# Patient Record
Sex: FEMALE | Race: WHITE | URBAN - METROPOLITAN AREA
[De-identification: names, ages, dates, MRNs, and addresses within clinical notes are randomized per-mention and may not be internally consistent; named-entity substitution may affect disease eponyms.]

---

## 2024-04-17 ENCOUNTER — TELEPHONE (OUTPATIENT)
Dept: PRIMARY CARE CLINIC | Age: 44
End: 2024-04-17

## 2024-04-17 NOTE — TELEPHONE ENCOUNTER
----- Message from Marilynn Hurley sent at 4/16/2024  3:55 PM EDT -----  Subject: Appointment Request    Reason for Call: New Patient/New to Provider Appointment needed: New   Patient Request Appointment    QUESTIONS    Reason for appointment request? Available appointments did not meet   patient need     Additional Information for Provider? Pt wants appt after 315pm, so will   always need an appt after 315pm. Please call pt and let her know if this   is possible.   ---------------------------------------------------------------------------  --------------  CALL BACK INFO  [phone]; OK to leave message on voicemail  ---------------------------------------------------------------------------  --------------  SCRIPT ANSWERS

## 2024-04-19 NOTE — TELEPHONE ENCOUNTER
Patient called stating she needs a PCP before May as her provider with [facility] is leaving.    Writer informed patient that the only provider that has a new patient appointment after 3:15 is pushed out until June with evening new patient appointments.    Patient then stated \"Well my provider is leaving and that's not my fault that I can't get my medication for May.\"    Writer offered caller a 7:45 am in May.    Writer also informed patient that there are providers with 2:00pm-2:45pm  open spots and pt stated that will not work as she can only do after 3:15.     Caller asked to be informed of any cancellations.

## 2024-05-10 ENCOUNTER — HOSPITAL ENCOUNTER (EMERGENCY)
Age: 44
Discharge: ANOTHER HOSPITAL | End: 2024-05-10
Attending: EMERGENCY MEDICINE
Payer: MEDICAID

## 2024-05-10 ENCOUNTER — APPOINTMENT (OUTPATIENT)
Dept: GENERAL RADIOLOGY | Age: 44
End: 2024-05-10
Payer: MEDICAID

## 2024-05-10 ENCOUNTER — APPOINTMENT (OUTPATIENT)
Dept: CT IMAGING | Age: 44
End: 2024-05-10
Payer: MEDICAID

## 2024-05-10 ENCOUNTER — HOSPITAL ENCOUNTER (INPATIENT)
Age: 44
LOS: 3 days | Discharge: HOME | End: 2024-05-13
Attending: PSYCHIATRY & NEUROLOGY | Admitting: PSYCHIATRY & NEUROLOGY
Payer: MEDICAID

## 2024-05-10 VITALS
DIASTOLIC BLOOD PRESSURE: 78 MMHG | HEART RATE: 86 BPM | TEMPERATURE: 98.7 F | RESPIRATION RATE: 12 BRPM | BODY MASS INDEX: 31.18 KG/M2 | SYSTOLIC BLOOD PRESSURE: 128 MMHG | OXYGEN SATURATION: 98 % | WEIGHT: 194 LBS | HEIGHT: 66 IN

## 2024-05-10 DIAGNOSIS — R51.9 NONINTRACTABLE HEADACHE, UNSPECIFIED CHRONICITY PATTERN, UNSPECIFIED HEADACHE TYPE: ICD-10-CM

## 2024-05-10 DIAGNOSIS — R51.9 ACUTE NONINTRACTABLE HEADACHE, UNSPECIFIED HEADACHE TYPE: Primary | ICD-10-CM

## 2024-05-10 DIAGNOSIS — G03.9 MENINGITIS: Primary | ICD-10-CM

## 2024-05-10 LAB
ALBUMIN SERPL-MCNC: 4.6 G/DL (ref 3.5–5.2)
ALBUMIN/GLOB SERPL: 1.8 {RATIO} (ref 1–2.5)
ALP SERPL-CCNC: 59 U/L (ref 35–104)
ALT SERPL-CCNC: 11 U/L (ref 5–33)
ANION GAP SERPL CALCULATED.3IONS-SCNC: 10 MMOL/L (ref 9–17)
AST SERPL-CCNC: 17 U/L
BASOPHILS # BLD: 0.1 K/UL (ref 0–0.2)
BASOPHILS NFR BLD: 1 % (ref 0–2)
BILIRUB SERPL-MCNC: 0.2 MG/DL (ref 0.3–1.2)
BILIRUB UR QL STRIP: NEGATIVE
BUN SERPL-MCNC: 13 MG/DL (ref 6–20)
CALCIUM SERPL-MCNC: 9.2 MG/DL (ref 8.6–10.4)
CHLORIDE SERPL-SCNC: 104 MMOL/L (ref 98–107)
CLARITY UR: CLEAR
CO2 SERPL-SCNC: 25 MMOL/L (ref 20–31)
COLOR UR: YELLOW
COMMENT: NORMAL
CREAT SERPL-MCNC: 0.7 MG/DL (ref 0.5–0.9)
EOSINOPHIL # BLD: 0.1 K/UL (ref 0–0.4)
EOSINOPHILS RELATIVE PERCENT: 2 % (ref 1–4)
ERYTHROCYTE [DISTWIDTH] IN BLOOD BY AUTOMATED COUNT: 13.6 % (ref 12.5–15.4)
FLUAV AG SPEC QL: NEGATIVE
FLUBV AG SPEC QL: NEGATIVE
GFR, ESTIMATED: >90 ML/MIN/1.73M2
GLUCOSE SERPL-MCNC: 110 MG/DL (ref 70–99)
GLUCOSE UR STRIP-MCNC: NEGATIVE MG/DL
HCT VFR BLD AUTO: 40.3 % (ref 36–46)
HGB BLD-MCNC: 13.4 G/DL (ref 12–16)
HGB UR QL STRIP.AUTO: NEGATIVE
INR PPP: 0.9
KETONES UR STRIP-MCNC: NEGATIVE MG/DL
LACTATE BLDV-SCNC: 1.3 MMOL/L (ref 0.5–2.2)
LEUKOCYTE ESTERASE UR QL STRIP: NEGATIVE
LIPASE SERPL-CCNC: 32 U/L (ref 13–60)
LYMPHOCYTES NFR BLD: 2.4 K/UL (ref 1–4.8)
LYMPHOCYTES RELATIVE PERCENT: 33 % (ref 24–44)
MAGNESIUM SERPL-MCNC: 2.2 MG/DL (ref 1.6–2.6)
MCH RBC QN AUTO: 29.1 PG (ref 26–34)
MCHC RBC AUTO-ENTMCNC: 33.2 G/DL (ref 31–37)
MCV RBC AUTO: 87.7 FL (ref 80–100)
MONOCYTES NFR BLD: 0.5 K/UL (ref 0.1–1.2)
MONOCYTES NFR BLD: 7 % (ref 2–11)
NEUTROPHILS NFR BLD: 57 % (ref 36–66)
NEUTS SEG NFR BLD: 4.2 K/UL (ref 1.8–7.7)
NITRITE UR QL STRIP: NEGATIVE
PARTIAL THROMBOPLASTIN TIME: 25 SEC (ref 21.3–31.3)
PH UR STRIP: 8 [PH] (ref 5–8)
PLATELET # BLD AUTO: 280 K/UL (ref 140–450)
PMV BLD AUTO: 7.2 FL (ref 6–12)
POTASSIUM SERPL-SCNC: 3.9 MMOL/L (ref 3.7–5.3)
PROT SERPL-MCNC: 7.2 G/DL (ref 6.4–8.3)
PROT UR STRIP-MCNC: NEGATIVE MG/DL
PROTHROMBIN TIME: 9.3 SEC (ref 9.4–12.6)
RBC # BLD AUTO: 4.6 M/UL (ref 4–5.2)
SARS-COV-2 RDRP RESP QL NAA+PROBE: NOT DETECTED
SODIUM SERPL-SCNC: 139 MMOL/L (ref 135–144)
SP GR UR STRIP: 1.01 (ref 1–1.03)
SPECIMEN DESCRIPTION: NORMAL
UROBILINOGEN UR STRIP-ACNC: NORMAL EU/DL (ref 0–1)
WBC OTHER # BLD: 7.3 K/UL (ref 3.5–11)

## 2024-05-10 PROCEDURE — 87040 BLOOD CULTURE FOR BACTERIA: CPT

## 2024-05-10 PROCEDURE — 2060000000 HC ICU INTERMEDIATE R&B

## 2024-05-10 PROCEDURE — 96375 TX/PRO/DX INJ NEW DRUG ADDON: CPT

## 2024-05-10 PROCEDURE — 87804 INFLUENZA ASSAY W/OPTIC: CPT

## 2024-05-10 PROCEDURE — 36415 COLL VENOUS BLD VENIPUNCTURE: CPT

## 2024-05-10 PROCEDURE — 83605 ASSAY OF LACTIC ACID: CPT

## 2024-05-10 PROCEDURE — 85730 THROMBOPLASTIN TIME PARTIAL: CPT

## 2024-05-10 PROCEDURE — 6370000000 HC RX 637 (ALT 250 FOR IP)

## 2024-05-10 PROCEDURE — 96366 THER/PROPH/DIAG IV INF ADDON: CPT

## 2024-05-10 PROCEDURE — 85610 PROTHROMBIN TIME: CPT

## 2024-05-10 PROCEDURE — 62270 DX LMBR SPI PNXR: CPT

## 2024-05-10 PROCEDURE — 96376 TX/PRO/DX INJ SAME DRUG ADON: CPT

## 2024-05-10 PROCEDURE — 83690 ASSAY OF LIPASE: CPT

## 2024-05-10 PROCEDURE — 85025 COMPLETE CBC W/AUTO DIFF WBC: CPT

## 2024-05-10 PROCEDURE — 70450 CT HEAD/BRAIN W/O DYE: CPT

## 2024-05-10 PROCEDURE — 81003 URINALYSIS AUTO W/O SCOPE: CPT

## 2024-05-10 PROCEDURE — 2580000003 HC RX 258

## 2024-05-10 PROCEDURE — 80053 COMPREHEN METABOLIC PANEL: CPT

## 2024-05-10 PROCEDURE — 96367 TX/PROPH/DG ADDL SEQ IV INF: CPT

## 2024-05-10 PROCEDURE — 83735 ASSAY OF MAGNESIUM: CPT

## 2024-05-10 PROCEDURE — 6360000002 HC RX W HCPCS

## 2024-05-10 PROCEDURE — 71046 X-RAY EXAM CHEST 2 VIEWS: CPT

## 2024-05-10 PROCEDURE — 6360000002 HC RX W HCPCS: Performed by: EMERGENCY MEDICINE

## 2024-05-10 PROCEDURE — 96365 THER/PROPH/DIAG IV INF INIT: CPT

## 2024-05-10 PROCEDURE — 87635 SARS-COV-2 COVID-19 AMP PRB: CPT

## 2024-05-10 RX ORDER — SODIUM CHLORIDE 9 MG/ML
INJECTION, SOLUTION INTRAVENOUS PRN
Status: DISCONTINUED | OUTPATIENT
Start: 2024-05-10 | End: 2024-05-13 | Stop reason: HOSPADM

## 2024-05-10 RX ORDER — VENLAFAXINE 100 MG/1
100 TABLET ORAL 2 TIMES DAILY
COMMUNITY

## 2024-05-10 RX ORDER — ACETAMINOPHEN 325 MG/1
650 TABLET ORAL EVERY 6 HOURS PRN
Status: DISCONTINUED | OUTPATIENT
Start: 2024-05-10 | End: 2024-05-13 | Stop reason: HOSPADM

## 2024-05-10 RX ORDER — POLYETHYLENE GLYCOL 3350 17 G/17G
17 POWDER, FOR SOLUTION ORAL DAILY PRN
Status: DISCONTINUED | OUTPATIENT
Start: 2024-05-10 | End: 2024-05-13 | Stop reason: HOSPADM

## 2024-05-10 RX ORDER — POTASSIUM CHLORIDE 7.45 MG/ML
10 INJECTION INTRAVENOUS PRN
Status: DISCONTINUED | OUTPATIENT
Start: 2024-05-10 | End: 2024-05-13 | Stop reason: HOSPADM

## 2024-05-10 RX ORDER — MAGNESIUM SULFATE IN WATER 40 MG/ML
2000 INJECTION, SOLUTION INTRAVENOUS ONCE
Status: COMPLETED | OUTPATIENT
Start: 2024-05-11 | End: 2024-05-11

## 2024-05-10 RX ORDER — SODIUM CHLORIDE 0.9 % (FLUSH) 0.9 %
5-40 SYRINGE (ML) INJECTION EVERY 12 HOURS SCHEDULED
Status: DISCONTINUED | OUTPATIENT
Start: 2024-05-10 | End: 2024-05-13 | Stop reason: HOSPADM

## 2024-05-10 RX ORDER — DEXAMETHASONE SODIUM PHOSPHATE 10 MG/ML
10 INJECTION, SOLUTION INTRAMUSCULAR; INTRAVENOUS ONCE
Status: COMPLETED | OUTPATIENT
Start: 2024-05-10 | End: 2024-05-10

## 2024-05-10 RX ORDER — ACETAMINOPHEN 650 MG/1
650 SUPPOSITORY RECTAL EVERY 6 HOURS PRN
Status: DISCONTINUED | OUTPATIENT
Start: 2024-05-10 | End: 2024-05-13 | Stop reason: HOSPADM

## 2024-05-10 RX ORDER — SODIUM CHLORIDE 0.9 % (FLUSH) 0.9 %
5-40 SYRINGE (ML) INJECTION PRN
Status: DISCONTINUED | OUTPATIENT
Start: 2024-05-10 | End: 2024-05-13 | Stop reason: HOSPADM

## 2024-05-10 RX ORDER — KETOROLAC TROMETHAMINE 15 MG/ML
15 INJECTION, SOLUTION INTRAMUSCULAR; INTRAVENOUS EVERY 4 HOURS
Status: DISCONTINUED | OUTPATIENT
Start: 2024-05-11 | End: 2024-05-11

## 2024-05-10 RX ORDER — MORPHINE SULFATE 2 MG/ML
2 INJECTION, SOLUTION INTRAMUSCULAR; INTRAVENOUS
Status: COMPLETED | OUTPATIENT
Start: 2024-05-10 | End: 2024-05-10

## 2024-05-10 RX ORDER — MAGNESIUM SULFATE IN WATER 40 MG/ML
2000 INJECTION, SOLUTION INTRAVENOUS PRN
Status: DISCONTINUED | OUTPATIENT
Start: 2024-05-10 | End: 2024-05-13 | Stop reason: HOSPADM

## 2024-05-10 RX ORDER — PROCHLORPERAZINE EDISYLATE 5 MG/ML
10 INJECTION INTRAMUSCULAR; INTRAVENOUS ONCE
Status: COMPLETED | OUTPATIENT
Start: 2024-05-11 | End: 2024-05-11

## 2024-05-10 RX ORDER — MORPHINE SULFATE 4 MG/ML
4 INJECTION, SOLUTION INTRAMUSCULAR; INTRAVENOUS ONCE
Status: COMPLETED | OUTPATIENT
Start: 2024-05-10 | End: 2024-05-10

## 2024-05-10 RX ORDER — KETOROLAC TROMETHAMINE 15 MG/ML
30 INJECTION, SOLUTION INTRAMUSCULAR; INTRAVENOUS ONCE
Status: COMPLETED | OUTPATIENT
Start: 2024-05-11 | End: 2024-05-11

## 2024-05-10 RX ORDER — IBUPROFEN 200 MG
400 TABLET ORAL EVERY 6 HOURS PRN
Status: DISCONTINUED | OUTPATIENT
Start: 2024-05-10 | End: 2024-05-10 | Stop reason: HOSPADM

## 2024-05-10 RX ORDER — DEXAMETHASONE SODIUM PHOSPHATE 4 MG/ML
4 INJECTION, SOLUTION INTRA-ARTICULAR; INTRALESIONAL; INTRAMUSCULAR; INTRAVENOUS; SOFT TISSUE EVERY 8 HOURS
Status: COMPLETED | OUTPATIENT
Start: 2024-05-11 | End: 2024-05-11

## 2024-05-10 RX ORDER — ENOXAPARIN SODIUM 100 MG/ML
40 INJECTION SUBCUTANEOUS DAILY
Status: DISCONTINUED | OUTPATIENT
Start: 2024-05-11 | End: 2024-05-13 | Stop reason: HOSPADM

## 2024-05-10 RX ORDER — KETOROLAC TROMETHAMINE 15 MG/ML
15 INJECTION, SOLUTION INTRAMUSCULAR; INTRAVENOUS EVERY 6 HOURS PRN
Status: DISCONTINUED | OUTPATIENT
Start: 2024-05-10 | End: 2024-05-10

## 2024-05-10 RX ORDER — SODIUM CHLORIDE 9 MG/ML
INJECTION, SOLUTION INTRAVENOUS CONTINUOUS
Status: DISCONTINUED | OUTPATIENT
Start: 2024-05-10 | End: 2024-05-13 | Stop reason: HOSPADM

## 2024-05-10 RX ORDER — PREGABALIN 100 MG/1
100 CAPSULE ORAL 2 TIMES DAILY
Status: ON HOLD | COMMUNITY

## 2024-05-10 RX ORDER — PREGABALIN 100 MG/1
100 CAPSULE ORAL 2 TIMES DAILY
Status: DISCONTINUED | OUTPATIENT
Start: 2024-05-10 | End: 2024-05-13 | Stop reason: HOSPADM

## 2024-05-10 RX ORDER — ONDANSETRON 4 MG/1
4 TABLET, ORALLY DISINTEGRATING ORAL EVERY 8 HOURS PRN
Status: DISCONTINUED | OUTPATIENT
Start: 2024-05-10 | End: 2024-05-13 | Stop reason: HOSPADM

## 2024-05-10 RX ORDER — ONDANSETRON 2 MG/ML
4 INJECTION INTRAMUSCULAR; INTRAVENOUS ONCE
Status: COMPLETED | OUTPATIENT
Start: 2024-05-10 | End: 2024-05-10

## 2024-05-10 RX ORDER — LORAZEPAM 2 MG/ML
4 INJECTION INTRAMUSCULAR EVERY 5 MIN PRN
Status: DISCONTINUED | OUTPATIENT
Start: 2024-05-10 | End: 2024-05-13 | Stop reason: HOSPADM

## 2024-05-10 RX ORDER — ONDANSETRON 2 MG/ML
4 INJECTION INTRAMUSCULAR; INTRAVENOUS EVERY 6 HOURS PRN
Status: DISCONTINUED | OUTPATIENT
Start: 2024-05-10 | End: 2024-05-13 | Stop reason: HOSPADM

## 2024-05-10 RX ORDER — POTASSIUM CHLORIDE 20 MEQ/1
40 TABLET, EXTENDED RELEASE ORAL PRN
Status: DISCONTINUED | OUTPATIENT
Start: 2024-05-10 | End: 2024-05-13 | Stop reason: HOSPADM

## 2024-05-10 RX ORDER — DIPHENHYDRAMINE HYDROCHLORIDE 50 MG/ML
50 INJECTION INTRAMUSCULAR; INTRAVENOUS ONCE
Status: COMPLETED | OUTPATIENT
Start: 2024-05-11 | End: 2024-05-11

## 2024-05-10 RX ADMIN — ONDANSETRON 4 MG: 2 INJECTION INTRAMUSCULAR; INTRAVENOUS at 17:00

## 2024-05-10 RX ADMIN — IBUPROFEN 400 MG: 200 TABLET, FILM COATED ORAL at 16:34

## 2024-05-10 RX ADMIN — CEFTRIAXONE SODIUM 1000 MG: 1 INJECTION, POWDER, FOR SOLUTION INTRAMUSCULAR; INTRAVENOUS at 16:35

## 2024-05-10 RX ADMIN — VANCOMYCIN HYDROCHLORIDE 2000 MG: 1 INJECTION, POWDER, LYOPHILIZED, FOR SOLUTION INTRAVENOUS at 17:00

## 2024-05-10 RX ADMIN — DEXAMETHASONE SODIUM PHOSPHATE 10 MG: 10 INJECTION INTRAMUSCULAR; INTRAVENOUS at 16:37

## 2024-05-10 RX ADMIN — MORPHINE SULFATE 4 MG: 4 INJECTION INTRAVENOUS at 19:22

## 2024-05-10 RX ADMIN — ACYCLOVIR SODIUM 700 MG: 50 INJECTION, SOLUTION INTRAVENOUS at 19:30

## 2024-05-10 RX ADMIN — MORPHINE SULFATE 4 MG: 4 INJECTION INTRAVENOUS at 17:00

## 2024-05-10 RX ADMIN — MORPHINE SULFATE 2 MG: 2 INJECTION, SOLUTION INTRAMUSCULAR; INTRAVENOUS at 23:52

## 2024-05-10 RX ADMIN — DEXAMETHASONE SODIUM PHOSPHATE 4 MG: 4 INJECTION, SOLUTION INTRAMUSCULAR; INTRAVENOUS at 23:55

## 2024-05-10 RX ADMIN — PREGABALIN 100 MG: 100 CAPSULE ORAL at 23:43

## 2024-05-10 ASSESSMENT — ENCOUNTER SYMPTOMS
PHOTOPHOBIA: 1
SHORTNESS OF BREATH: 0
NAUSEA: 1
NAUSEA: 0
VOMITING: 0
SORE THROAT: 0
CHEST TIGHTNESS: 0
DIARRHEA: 0
VOMITING: 1
CONSTIPATION: 0
COUGH: 0
ABDOMINAL PAIN: 0

## 2024-05-10 ASSESSMENT — PAIN SCALES - GENERAL
PAINLEVEL_OUTOF10: 10
PAINLEVEL_OUTOF10: 10

## 2024-05-10 ASSESSMENT — PAIN DESCRIPTION - DESCRIPTORS: DESCRIPTORS: ACHING;POUNDING

## 2024-05-10 ASSESSMENT — PAIN DESCRIPTION - PAIN TYPE: TYPE: ACUTE PAIN

## 2024-05-10 ASSESSMENT — PAIN - FUNCTIONAL ASSESSMENT: PAIN_FUNCTIONAL_ASSESSMENT: 0-10

## 2024-05-10 ASSESSMENT — PAIN DESCRIPTION - LOCATION: LOCATION: HEAD;NECK

## 2024-05-10 NOTE — ED NOTES
1933 Face sheet and transport necessity for faxed to [facility] to set up transport at this time

## 2024-05-10 NOTE — ED PROVIDER NOTES
[facility] Emergency Department  [address]  [place] [address]  Phone: [phone]  Fax: [phone]        Pt Name: Cally Ballesteros  MRN: [id number]  Birthdate 1980  Date of evaluation: 5/10/24      CHIEF COMPLAINT     Chief Complaint   Patient presents with    Headache     Persistent, diffuse headache.  Neck pains. Stiff neck. Pain when rotating head left/right.  (Pt reports having history of meningitis 3 times.)         HISTORY OF PRESENT ILLNESS    Cally Ballesteros is a 43 y.o. female who presents to our Emergency Department.    Patient presents emerged part complaining of headache.  Patient states that she has meningitis multiple times in the past.  Unsure if she was viral versus bacterial but states that she was in isolation each time this happened.  Patient states that it feels like her exact same history.  Over the past 4 days she has progressively worsening of headache.  States that she also has neck stiffness and pain around her shoulders.  States that she does not have any cough.  No nausea no vomiting.  No diarrhea no constipation.  States that she does not any fevers but feels like her heart sometimes beat out of her chest.  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  Patient states that last symptoms happen she was in [place].  Patient states that it was not in this area.  States that this was over 5 years ago.        REVIEW OF SYSTEMS       Review of Systems   Constitutional:  Negative for chills, diaphoresis and fever.   HENT:  Negative for drooling.    Eyes:  Positive for photophobia.   Respiratory:  Negative for cough, chest tightness and shortness of breath.    Cardiovascular:  Negative for chest pain and palpitations.   Gastrointestinal:  Negative for abdominal pain, constipation, diarrhea, nausea and vomiting.   Genitourinary:   abnormality of the visualized skull or soft tissues.     No acute intracranial abnormality.     XR CHEST (2 VW)    Result Date: 5/10/2024  EXAMINATION: TWO XRAY VIEWS OF THE CHEST 5/10/2024 3:43 pm COMPARISON: None. HISTORY: ORDERING SYSTEM PROVIDED HISTORY: Shortness of Breath TECHNOLOGIST PROVIDED HISTORY: Shortness of Breath Reason for Exam: SOB, headache FINDINGS: HEART/MEDIASTINUM: The cardiomediastinal silhouette is within normal limits. PLEURA/LUNGS: There are no focal consolidations or pleural effusions. There is no appreciable pneumothorax. BONES/SOFT TISSUE: No acute abnormality.     No radiographic evidence of acute pulmonary disease.       Interpretation per the Radiologist below, if available at the time of this note:  XR CHEST (2 VW)   Final Result   No radiographic evidence of acute pulmonary disease.         CT Head W/O Contrast   Final Result   No acute intracranial abnormality.             LABS:  The following laboratory tests were ordered, reviewed, and interpreted by myself:   Results for orders placed or performed during the hospital encounter of 05/10/24   Blood Culture 1    Specimen: Blood   Result Value Ref Range    Specimen Description .BLOOD     Special Requests LEFT FOREARM 10CC     Culture NO GROWTH <24 HRS    Culture, Blood 2    Specimen: Blood   Result Value Ref Range    Specimen Description .BLOOD     Special Requests 1ML RIGHT HAND     Culture NO GROWTH <24 HRS    COVID-19, Rapid    Specimen: Nasopharyngeal Swab   Result Value Ref Range    Specimen Description .NASOPHARYNGEAL SWAB     SARS-CoV-2, Rapid Not Detected Not Detected   Rapid influenza A/B antigens    Specimen: Nasopharyngeal   Result Value Ref Range    Flu A Antigen NEGATIVE NEGATIVE    Flu B Antigen NEGATIVE NEGATIVE   CBC with Auto Differential   Result Value Ref Range    WBC 7.3 3.5 - 11.0 k/uL    RBC 4.60 4.0 - 5.2 m/uL    Hemoglobin 13.4 12.0 - 16.0 g/dL    Hematocrit 40.3 36 - 46 %    MCV 87.7 80 - 100 fL    MCH 29.1

## 2024-05-10 NOTE — ED PROVIDER NOTES
[facility] Emergency Department  [address]  [place] [address]  Phone: [phone]  Fax: [phone]    EMERGENCY DEPARTMENT ENCOUNTER          Pt Name: Cally Ballesteros  MRN: [id number]  Birthdate 1980  Date of evaluation: 5/10/2024      CHIEF COMPLAINT       Chief Complaint   Patient presents with    Headache     Persistent, diffuse headache.  Neck pains. Stiff neck. Pain when rotating head left/right.  (Pt reports having history of meningitis 3 times.)       HISTORY OF PRESENT ILLNESS       Cally Ballesteros is a 43 y.o. female with a past medical history of multiple episodes of meningitis who presents headache and neck stiffness for patient was at the started 4 days ago and she started experiencing headaches, photosensitivity, back and neck shoulder pain with exhaustion.  And blurry vision.  She notes that her presentation is similar to in the past when she has had meningitis.  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    REVIEW OF SYSTEMS       Review of Systems   Constitutional:  Positive for appetite change and fatigue. Negative for chills and fever.   HENT:  Negative for congestion and sore throat.    Eyes:  Positive for visual disturbance.   Respiratory:  Negative for cough, chest tightness and shortness of breath.    Cardiovascular:  Negative for chest pain.   Gastrointestinal:  Positive for nausea and vomiting. Negative for abdominal pain and diarrhea.   Genitourinary:  Negative for difficulty urinating and dyspareunia.   Skin:  Negative for rash.   Neurological:  Positive for weakness and headaches.      PAST MEDICAL HISTORY    has no past medical history on file.    SURGICAL HISTORY      has no past surgical history on file.    CURRENT MEDICATIONS       Current Discharge Medication List        CONTINUE these medications which have NOT CHANGED    Details   pregabalin (LYRICA) 100 MG capsule Take 1 capsule by mouth 2  (DM,HTN,CA, etc): See past medical history.    Social Determinants of Health affecting care (unable to care for self, lives alone, unemployed, homeless,etc): None    History source(s) (patient,spouse,parent,family,friend,EMS,etc): Patient    Review of external sources (ECF,Hospital records,EMS report, radiology reports, etc): Reviewed    Tests considered but not ordered: Not applicable    Independent interpretation of tests (eg.  X-ray, CAT scan, Doppler studies, EKG): ECG interpreted by myself if completed.    Discussion of x-ray results with radiology: See below    Consults: See below    Consideration for admission/observation (even if discharged): Considered    Prescription considerations: Not applicable    Sepsis considered: Considered but unlikely    Critical Care note written: See below    DIAGNOSTIC RESULTS     EKG: All EKG's are interpreted by the Emergency Department Physician who either signs or Co-signs this chart in the absence of a cardiologist.        RADIOLOGY:   Interpretation per the Radiologist below, if available at the time of this note:  XR CHEST (2 VW)   Final Result   No radiographic evidence of acute pulmonary disease.         CT Head W/O Contrast   Final Result   No acute intracranial abnormality.             No results found.    LABS:  Results for orders placed or performed during the hospital encounter of 05/10/24   COVID-19, Rapid    Specimen: Nasopharyngeal Swab   Result Value Ref Range    Specimen Description .NASOPHARYNGEAL SWAB     SARS-CoV-2, Rapid Not Detected Not Detected   Rapid influenza A/B antigens    Specimen: Nasopharyngeal   Result Value Ref Range    Flu A Antigen NEGATIVE NEGATIVE    Flu B Antigen NEGATIVE NEGATIVE   CBC with Auto Differential   Result Value Ref Range    WBC 7.3 3.5 - 11.0 k/uL    RBC 4.60 4.0 - 5.2 m/uL    Hemoglobin 13.4 12.0 - 16.0 g/dL    Hematocrit 40.3 36 - 46 %    MCV 87.7 80 - 100 fL    MCH 29.1 26 - 34 pg    MCHC 33.2 31 - 37 g/dL    RDW 13.6 12.5    Eosinophils % 2   Basophils % 1   Neutrophils Absolute 4.20   Lymphocytes Absolute 2.40   Monocytes Absolute 0.50   Eosinophils Absolute 0.10   Basophils Absolute 0.10  Unremarkable [PA]   1532 CMP(!):    Sodium 139   Potassium 3.9   Chloride 104   CARBON DIOXIDE 25   Anion Gap 10   Glucose 110(!)   BUN,BUNPL 13   Creatinine 0.7   Est, Glom Filt Rate >90   Calcium 9.2   Total Protein 7.2   Albumin 4.6   Albumin/Globulin Ratio 1.8   Total Bilirubin 0.2(!)   Alkaline Phosphatase 59   ALT 11   AST 17  Unremarkable   [PA]   1532 Lipase:    Lipase 32  Unremarkable [PA]   1533 Lactic Acid:    Lactic Acid 1.3  Unremarkable [PA]   1533 Magnesium:    Magnesium 2.2  Unremarkable [PA]   1610 XR CHEST (2 VW)  No acute process [PA]   1616 Prothrombin Time(!): 9.3  Unremarkable [PA]   1636 CT Head W/O Contrast  No acute processes [PA]   1652 COVID-19, Rapid:    Specimen Description .NASOPHARYNGEAL SWAB   SARS-CoV-2, Rapid Not Detected  Negative [PA]   1659 Rapid influenza A/B antigens:    Flu A Antigen NEGATIVE   Flu B Antigen NEGATIVE  Negative [PA]   1833 An attempt was made to do a spinal tap but we were unable to draw any fluid.  Called neurology, and spoke with Dr. Ceron regarding the inability to get a spinal tap.  Recommended that she be transferred to [facility] so that she could be managed there as otherwise IR will not be able to be present to tap her until next Thursday at [facility]. [PA]   1836 Urinalysis with Reflex to Culture:    Color, UA Yellow   Turbidity UA Clear   Glucose, Ur NEGATIVE   Bilirubin, Urine NEGATIVE   Ketones, Urine NEGATIVE   Specific [place], UA 1.015   Urine Hgb NEGATIVE   pH, Urine 8.0   Protein, UA NEGATIVE   Urobilinogen Normal   Nitrite, Urine NEGATIVE   Leukocyte Esterase, Urine NEGATIVE   COMMENT, [id number] Microscopic exam not performed based on chemical results unless requested in original order.   COMMENT, [id number]        COMMENT, [id number] Utilizing a urinalysis as the only screening

## 2024-05-11 ENCOUNTER — APPOINTMENT (OUTPATIENT)
Dept: MRI IMAGING | Age: 44
End: 2024-05-11
Attending: PSYCHIATRY & NEUROLOGY
Payer: MEDICAID

## 2024-05-11 PROBLEM — R51.9 ACUTE NONINTRACTABLE HEADACHE: Status: ACTIVE | Noted: 2024-05-11

## 2024-05-11 LAB
ANION GAP SERPL CALCULATED.3IONS-SCNC: 12 MMOL/L (ref 9–16)
BASOPHILS # BLD: <0.03 K/UL (ref 0–0.2)
BASOPHILS NFR BLD: 0 % (ref 0–2)
BUN SERPL-MCNC: 14 MG/DL (ref 6–20)
CALCIUM SERPL-MCNC: 8 MG/DL (ref 8.6–10.4)
CHLORIDE SERPL-SCNC: 107 MMOL/L (ref 98–107)
CO2 SERPL-SCNC: 19 MMOL/L (ref 20–31)
CREAT SERPL-MCNC: 0.8 MG/DL (ref 0.5–0.9)
CRP SERPL HS-MCNC: <3 MG/L (ref 0–5)
EOSINOPHIL # BLD: <0.03 K/UL (ref 0–0.44)
EOSINOPHILS RELATIVE PERCENT: 0 % (ref 1–4)
ERYTHROCYTE [DISTWIDTH] IN BLOOD BY AUTOMATED COUNT: 13.6 % (ref 11.8–14.4)
ERYTHROCYTE [SEDIMENTATION RATE] IN BLOOD BY PHOTOMETRIC METHOD: 1 MM/HR (ref 0–20)
GFR, ESTIMATED: >90 ML/MIN/1.73M2
GLUCOSE SERPL-MCNC: 151 MG/DL (ref 74–99)
HCT VFR BLD AUTO: 42.2 % (ref 36.3–47.1)
HGB BLD-MCNC: 13.4 G/DL (ref 11.9–15.1)
HIV 1+2 AB+HIV1 P24 AG SERPL QL IA: NONREACTIVE
IMM GRANULOCYTES # BLD AUTO: 0.06 K/UL (ref 0–0.3)
IMM GRANULOCYTES NFR BLD: 1 %
LYMPHOCYTES NFR BLD: 0.96 K/UL (ref 1.1–3.7)
LYMPHOCYTES RELATIVE PERCENT: 9 % (ref 24–43)
MAGNESIUM SERPL-MCNC: 2.6 MG/DL (ref 1.6–2.6)
MCH RBC QN AUTO: 28.9 PG (ref 25.2–33.5)
MCHC RBC AUTO-ENTMCNC: 31.8 G/DL (ref 28.4–34.8)
MCV RBC AUTO: 91.1 FL (ref 82.6–102.9)
MONOCYTES NFR BLD: 0.29 K/UL (ref 0.1–1.2)
MONOCYTES NFR BLD: 3 % (ref 3–12)
NEUTROPHILS NFR BLD: 88 % (ref 36–65)
NEUTS SEG NFR BLD: 9.79 K/UL (ref 1.5–8.1)
NRBC BLD-RTO: 0 PER 100 WBC
PLATELET # BLD AUTO: 252 K/UL (ref 138–453)
PMV BLD AUTO: 9.4 FL (ref 8.1–13.5)
POTASSIUM SERPL-SCNC: 3.9 MMOL/L (ref 3.7–5.3)
RBC # BLD AUTO: 4.63 M/UL (ref 3.95–5.11)
SODIUM SERPL-SCNC: 138 MMOL/L (ref 136–145)
T PALLIDUM AB SER QL IA: NONREACTIVE
WBC OTHER # BLD: 11.1 K/UL (ref 3.5–11.3)

## 2024-05-11 PROCEDURE — 2060000000 HC ICU INTERMEDIATE R&B

## 2024-05-11 PROCEDURE — 82784 ASSAY IGA/IGD/IGG/IGM EACH: CPT

## 2024-05-11 PROCEDURE — 6360000004 HC RX CONTRAST MEDICATION

## 2024-05-11 PROCEDURE — 87389 HIV-1 AG W/HIV-1&-2 AB AG IA: CPT

## 2024-05-11 PROCEDURE — 6360000002 HC RX W HCPCS

## 2024-05-11 PROCEDURE — 86789 WEST NILE VIRUS ANTIBODY: CPT

## 2024-05-11 PROCEDURE — 86038 ANTINUCLEAR ANTIBODIES: CPT

## 2024-05-11 PROCEDURE — 70553 MRI BRAIN STEM W/O & W/DYE: CPT

## 2024-05-11 PROCEDURE — 36415 COLL VENOUS BLD VENIPUNCTURE: CPT

## 2024-05-11 PROCEDURE — 2580000003 HC RX 258

## 2024-05-11 PROCEDURE — 6370000000 HC RX 637 (ALT 250 FOR IP)

## 2024-05-11 PROCEDURE — 82042 OTHER SOURCE ALBUMIN QUAN EA: CPT

## 2024-05-11 PROCEDURE — 89050 BODY FLUID CELL COUNT: CPT

## 2024-05-11 PROCEDURE — 82945 GLUCOSE OTHER FLUID: CPT

## 2024-05-11 PROCEDURE — 86225 DNA ANTIBODY NATIVE: CPT

## 2024-05-11 PROCEDURE — 87483 CNS DNA AMP PROBE TYPE 12-25: CPT

## 2024-05-11 PROCEDURE — 83916 OLIGOCLONAL BANDS: CPT

## 2024-05-11 PROCEDURE — 85652 RBC SED RATE AUTOMATED: CPT

## 2024-05-11 PROCEDURE — 6360000002 HC RX W HCPCS: Performed by: STUDENT IN AN ORGANIZED HEALTH CARE EDUCATION/TRAINING PROGRAM

## 2024-05-11 PROCEDURE — 86618 LYME DISEASE ANTIBODY: CPT

## 2024-05-11 PROCEDURE — 2580000003 HC RX 258: Performed by: STUDENT IN AN ORGANIZED HEALTH CARE EDUCATION/TRAINING PROGRAM

## 2024-05-11 PROCEDURE — 87070 CULTURE OTHR SPECIMN AEROBIC: CPT

## 2024-05-11 PROCEDURE — 86780 TREPONEMA PALLIDUM: CPT

## 2024-05-11 PROCEDURE — 84157 ASSAY OF PROTEIN OTHER: CPT

## 2024-05-11 PROCEDURE — 83516 IMMUNOASSAY NONANTIBODY: CPT

## 2024-05-11 PROCEDURE — 99222 1ST HOSP IP/OBS MODERATE 55: CPT | Performed by: PSYCHIATRY & NEUROLOGY

## 2024-05-11 PROCEDURE — 86480 TB TEST CELL IMMUN MEASURE: CPT

## 2024-05-11 PROCEDURE — 80048 BASIC METABOLIC PNL TOTAL CA: CPT

## 2024-05-11 PROCEDURE — 86788 WEST NILE VIRUS AB IGM: CPT

## 2024-05-11 PROCEDURE — 83873 ASSAY OF CSF PROTEIN: CPT

## 2024-05-11 PROCEDURE — A9576 INJ PROHANCE MULTIPACK: HCPCS

## 2024-05-11 PROCEDURE — 86140 C-REACTIVE PROTEIN: CPT

## 2024-05-11 PROCEDURE — 82040 ASSAY OF SERUM ALBUMIN: CPT

## 2024-05-11 PROCEDURE — 83735 ASSAY OF MAGNESIUM: CPT

## 2024-05-11 PROCEDURE — 86592 SYPHILIS TEST NON-TREP QUAL: CPT

## 2024-05-11 PROCEDURE — 82164 ANGIOTENSIN I ENZYME TEST: CPT

## 2024-05-11 PROCEDURE — 85025 COMPLETE CBC W/AUTO DIFF WBC: CPT

## 2024-05-11 PROCEDURE — 2500000003 HC RX 250 WO HCPCS: Performed by: STUDENT IN AN ORGANIZED HEALTH CARE EDUCATION/TRAINING PROGRAM

## 2024-05-11 PROCEDURE — 87205 SMEAR GRAM STAIN: CPT

## 2024-05-11 RX ORDER — MAGNESIUM SULFATE 1 G/100ML
1000 INJECTION INTRAVENOUS ONCE
Status: COMPLETED | OUTPATIENT
Start: 2024-05-11 | End: 2024-05-12

## 2024-05-11 RX ORDER — PROCHLORPERAZINE EDISYLATE 5 MG/ML
10 INJECTION INTRAMUSCULAR; INTRAVENOUS ONCE
Status: COMPLETED | OUTPATIENT
Start: 2024-05-12 | End: 2024-05-11

## 2024-05-11 RX ORDER — KETOROLAC TROMETHAMINE 15 MG/ML
30 INJECTION, SOLUTION INTRAMUSCULAR; INTRAVENOUS ONCE
Status: COMPLETED | OUTPATIENT
Start: 2024-05-12 | End: 2024-05-11

## 2024-05-11 RX ORDER — SODIUM CHLORIDE 0.9 % (FLUSH) 0.9 %
10 SYRINGE (ML) INJECTION PRN
Status: DISCONTINUED | OUTPATIENT
Start: 2024-05-11 | End: 2024-05-13 | Stop reason: HOSPADM

## 2024-05-11 RX ORDER — MORPHINE SULFATE 2 MG/ML
2 INJECTION, SOLUTION INTRAMUSCULAR; INTRAVENOUS
Status: COMPLETED | OUTPATIENT
Start: 2024-05-11 | End: 2024-05-11

## 2024-05-11 RX ORDER — DIPHENHYDRAMINE HYDROCHLORIDE 50 MG/ML
25 INJECTION INTRAMUSCULAR; INTRAVENOUS ONCE
Status: COMPLETED | OUTPATIENT
Start: 2024-05-11 | End: 2024-05-11

## 2024-05-11 RX ORDER — DIPHENHYDRAMINE HYDROCHLORIDE 50 MG/ML
25 INJECTION INTRAMUSCULAR; INTRAVENOUS ONCE
Status: COMPLETED | OUTPATIENT
Start: 2024-05-12 | End: 2024-05-11

## 2024-05-11 RX ORDER — VENLAFAXINE 100 MG/1
100 TABLET ORAL 2 TIMES DAILY
Status: DISCONTINUED | OUTPATIENT
Start: 2024-05-11 | End: 2024-05-13 | Stop reason: HOSPADM

## 2024-05-11 RX ORDER — KETOROLAC TROMETHAMINE 30 MG/ML
30 INJECTION, SOLUTION INTRAMUSCULAR; INTRAVENOUS ONCE
Status: COMPLETED | OUTPATIENT
Start: 2024-05-11 | End: 2024-05-11

## 2024-05-11 RX ORDER — MAGNESIUM SULFATE IN WATER 40 MG/ML
2000 INJECTION, SOLUTION INTRAVENOUS ONCE
Status: COMPLETED | OUTPATIENT
Start: 2024-05-11 | End: 2024-05-11

## 2024-05-11 RX ORDER — MAGNESIUM SULFATE 1 G/100ML
1000 INJECTION INTRAVENOUS EVERY 6 HOURS PRN
Status: DISCONTINUED | OUTPATIENT
Start: 2024-05-11 | End: 2024-05-13 | Stop reason: HOSPADM

## 2024-05-11 RX ORDER — KETOROLAC TROMETHAMINE 15 MG/ML
15 INJECTION, SOLUTION INTRAMUSCULAR; INTRAVENOUS EVERY 4 HOURS PRN
Status: DISCONTINUED | OUTPATIENT
Start: 2024-05-11 | End: 2024-05-13 | Stop reason: HOSPADM

## 2024-05-11 RX ADMIN — PREGABALIN 100 MG: 100 CAPSULE ORAL at 21:23

## 2024-05-11 RX ADMIN — MAGNESIUM SULFATE HEPTAHYDRATE 1000 MG: 1 INJECTION, SOLUTION INTRAVENOUS at 23:40

## 2024-05-11 RX ADMIN — Medication 2000 MG: at 00:06

## 2024-05-11 RX ADMIN — ACYCLOVIR SODIUM 700 MG: 50 INJECTION, SOLUTION INTRAVENOUS at 21:26

## 2024-05-11 RX ADMIN — PROCHLORPERAZINE EDISYLATE 10 MG: 5 INJECTION INTRAMUSCULAR; INTRAVENOUS at 00:05

## 2024-05-11 RX ADMIN — ENOXAPARIN SODIUM 40 MG: 100 INJECTION SUBCUTANEOUS at 08:44

## 2024-05-11 RX ADMIN — DIPHENHYDRAMINE HYDROCHLORIDE 25 MG: 50 INJECTION INTRAMUSCULAR; INTRAVENOUS at 23:43

## 2024-05-11 RX ADMIN — DIPHENHYDRAMINE HYDROCHLORIDE 50 MG: 50 INJECTION INTRAMUSCULAR; INTRAVENOUS at 00:05

## 2024-05-11 RX ADMIN — KETOROLAC TROMETHAMINE 15 MG: 15 INJECTION, SOLUTION INTRAMUSCULAR; INTRAVENOUS at 15:43

## 2024-05-11 RX ADMIN — KETOROLAC TROMETHAMINE 15 MG: 15 INJECTION, SOLUTION INTRAMUSCULAR; INTRAVENOUS at 20:08

## 2024-05-11 RX ADMIN — Medication 2000 MG: at 11:20

## 2024-05-11 RX ADMIN — MORPHINE SULFATE 2 MG: 2 INJECTION, SOLUTION INTRAMUSCULAR; INTRAVENOUS at 18:13

## 2024-05-11 RX ADMIN — MAGNESIUM SULFATE HEPTAHYDRATE 2000 MG: 40 INJECTION, SOLUTION INTRAVENOUS at 11:40

## 2024-05-11 RX ADMIN — VENLAFAXINE HYDROCHLORIDE 100 MG: 100 TABLET ORAL at 21:23

## 2024-05-11 RX ADMIN — KETOROLAC TROMETHAMINE 15 MG: 15 INJECTION, SOLUTION INTRAMUSCULAR; INTRAVENOUS at 04:47

## 2024-05-11 RX ADMIN — MAGNESIUM SULFATE HEPTAHYDRATE 1000 MG: 1 INJECTION, SOLUTION INTRAVENOUS at 20:12

## 2024-05-11 RX ADMIN — Medication 2000 MG: at 23:40

## 2024-05-11 RX ADMIN — DIPHENHYDRAMINE HYDROCHLORIDE 25 MG: 50 INJECTION INTRAMUSCULAR; INTRAVENOUS at 11:30

## 2024-05-11 RX ADMIN — VANCOMYCIN HYDROCHLORIDE 1500 MG: 1.5 INJECTION, POWDER, LYOPHILIZED, FOR SOLUTION INTRAVENOUS at 04:50

## 2024-05-11 RX ADMIN — KETOROLAC TROMETHAMINE 15 MG: 15 INJECTION, SOLUTION INTRAMUSCULAR; INTRAVENOUS at 08:44

## 2024-05-11 RX ADMIN — PREGABALIN 100 MG: 100 CAPSULE ORAL at 08:44

## 2024-05-11 RX ADMIN — SODIUM CHLORIDE: 9 INJECTION, SOLUTION INTRAVENOUS at 00:09

## 2024-05-11 RX ADMIN — VALPROATE SODIUM 500 MG: 100 INJECTION, SOLUTION INTRAVENOUS at 19:53

## 2024-05-11 RX ADMIN — ACYCLOVIR SODIUM 700 MG: 50 INJECTION, SOLUTION INTRAVENOUS at 06:26

## 2024-05-11 RX ADMIN — ACETAMINOPHEN 650 MG: 325 TABLET ORAL at 15:42

## 2024-05-11 RX ADMIN — SODIUM CHLORIDE, PRESERVATIVE FREE 10 ML: 5 INJECTION INTRAVENOUS at 00:11

## 2024-05-11 RX ADMIN — GADOTERIDOL 20 ML: 279.3 INJECTION, SOLUTION INTRAVENOUS at 16:25

## 2024-05-11 RX ADMIN — MAGNESIUM SULFATE HEPTAHYDRATE 2000 MG: 40 INJECTION, SOLUTION INTRAVENOUS at 00:11

## 2024-05-11 RX ADMIN — SODIUM CHLORIDE, PRESERVATIVE FREE 10 ML: 5 INJECTION INTRAVENOUS at 21:26

## 2024-05-11 RX ADMIN — DEXAMETHASONE SODIUM PHOSPHATE 4 MG: 4 INJECTION, SOLUTION INTRAMUSCULAR; INTRAVENOUS at 15:48

## 2024-05-11 RX ADMIN — SODIUM CHLORIDE, PRESERVATIVE FREE 10 ML: 5 INJECTION INTRAVENOUS at 15:44

## 2024-05-11 RX ADMIN — VANCOMYCIN HYDROCHLORIDE 1500 MG: 1.5 INJECTION, POWDER, LYOPHILIZED, FOR SOLUTION INTRAVENOUS at 18:07

## 2024-05-11 RX ADMIN — SODIUM CHLORIDE, PRESERVATIVE FREE 10 ML: 5 INJECTION INTRAVENOUS at 18:13

## 2024-05-11 RX ADMIN — ACYCLOVIR SODIUM 700 MG: 50 INJECTION, SOLUTION INTRAVENOUS at 11:40

## 2024-05-11 RX ADMIN — DEXAMETHASONE SODIUM PHOSPHATE 4 MG: 4 INJECTION, SOLUTION INTRAMUSCULAR; INTRAVENOUS at 08:44

## 2024-05-11 RX ADMIN — ONDANSETRON 4 MG: 2 INJECTION INTRAMUSCULAR; INTRAVENOUS at 20:56

## 2024-05-11 RX ADMIN — SODIUM CHLORIDE, PRESERVATIVE FREE 10 ML: 5 INJECTION INTRAVENOUS at 08:45

## 2024-05-11 RX ADMIN — KETOROLAC TROMETHAMINE 30 MG: 15 INJECTION, SOLUTION INTRAMUSCULAR; INTRAVENOUS at 23:43

## 2024-05-11 RX ADMIN — SODIUM CHLORIDE, PRESERVATIVE FREE 10 ML: 5 INJECTION INTRAVENOUS at 16:25

## 2024-05-11 RX ADMIN — PROCHLORPERAZINE EDISYLATE 10 MG: 5 INJECTION INTRAMUSCULAR; INTRAVENOUS at 23:43

## 2024-05-11 RX ADMIN — VENLAFAXINE HYDROCHLORIDE 100 MG: 100 TABLET ORAL at 09:58

## 2024-05-11 RX ADMIN — KETOROLAC TROMETHAMINE 30 MG: 30 INJECTION, SOLUTION INTRAMUSCULAR; INTRAVENOUS at 11:30

## 2024-05-11 RX ADMIN — KETOROLAC TROMETHAMINE 30 MG: 15 INJECTION, SOLUTION INTRAMUSCULAR; INTRAVENOUS at 00:05

## 2024-05-11 RX ADMIN — VALPROATE SODIUM 500 MG: 100 INJECTION, SOLUTION INTRAVENOUS at 15:09

## 2024-05-11 ASSESSMENT — PAIN DESCRIPTION - LOCATION
LOCATION: HEAD

## 2024-05-11 ASSESSMENT — PAIN SCALES - GENERAL
PAINLEVEL_OUTOF10: 8
PAINLEVEL_OUTOF10: 9
PAINLEVEL_OUTOF10: 10
PAINLEVEL_OUTOF10: 10
PAINLEVEL_OUTOF10: 8
PAINLEVEL_OUTOF10: 9
PAINLEVEL_OUTOF10: 9

## 2024-05-11 ASSESSMENT — PAIN DESCRIPTION - DESCRIPTORS
DESCRIPTORS: ACHING

## 2024-05-11 NOTE — PLAN OF CARE
Problem: Discharge Planning  Goal: Discharge to home or other facility with appropriate resources  5/11/2024 1838 by Sophie Sanchez, RN  Outcome: Progressing  5/11/2024 1838 by Sophie Sanchez, RN  Outcome: Progressing  5/11/2024 0453 by Anabella Mullins, RN  Outcome: Progressing  Flowsheets (Taken 5/10/2024 2200)  Discharge to home or other facility with appropriate resources: Identify barriers to discharge with patient and caregiver     Problem: Pain  Goal: Verbalizes/displays adequate comfort level or baseline comfort level  Outcome: Progressing   Pain scale preformed per protocol and pt treated for pain as documented. Education given.

## 2024-05-11 NOTE — H&P
[facility] Neurology   IN-PATIENT SERVICE   [facility]    HISTORY AND PHYSICAL EXAMINATION            Date:   5/10/2024  Patient name:  Cally Ballesteros  Date of admission:  5/10/2024  9:52 PM  MRN:   [id number]  Account:  [id number]  YOB: 1980  PCP:    No primary care provider on file.  Room:   [address] [place] [address]  Code Status:    Full Code    Chief Complaint:     No chief complaint on file.      History Obtained From:     patient, electronic medical record    History of Present Illness:     Patient is a 43-year-old female with past medical history of multiple episodes of meningitis presenting for similar symptoms of worsening headache, photophobia, neck stiffness.    She initially presented to [place] emergency department for the symptoms of worsening headache along with photophobia and neck stiffness.  The symptoms started for the past 4 days, and have worsened.  She has tried many over-the-counter medications, however they have not worked.  There were attempts to obtain lumbar puncture in the ER, however they were unsuccessful.  She denies symptoms of nausea or vomiting.  She was transferred to [place] for further management.    Patient was seen and examined at bedside.  She complains of severe neck stiffness along with severe headache rated at 10/10.  She has limited motion of her eyes due to pain.  Patient is sitting very still in the hospital bed, and prefers to be in the dark due to light sensitivity.  She denies experiencing other focal neurological deficits or symptoms.  Patient was given migraine cocktail consisting of Toradol, Compazine, Benadryl, magnesium, and IV fluids.  This improved her headache, and allowed her to sleep.    CT head without IV contrast done on 5/10/2024 shows no acute intracranial abnormality.        Past Medical History:     No past medical history on file.     Past Surgical History:     No past surgical history on file.     Medications Prior to    Result Value Ref Range    Lipase 32 13 - 60 U/L   Magnesium    Collection Time: 05/10/24  3:05 PM   Result Value Ref Range    Magnesium 2.2 1.6 - 2.6 mg/dL   CMP    Collection Time: 05/10/24  3:05 PM   Result Value Ref Range    Sodium 139 135 - 144 mmol/L    Potassium 3.9 3.7 - 5.3 mmol/L    Chloride 104 98 - 107 mmol/L    CO2 25 20 - 31 mmol/L    Anion Gap 10 9 - 17 mmol/L    Glucose 110 (H) 70 - 99 mg/dL    BUN 13 6 - 20 mg/dL    Creatinine 0.7 0.5 - 0.9 mg/dL    Est, Glom Filt Rate >90 >60 mL/min/1.73m2    Calcium 9.2 8.6 - 10.4 mg/dL    Total Protein 7.2 6.4 - 8.3 g/dL    Albumin 4.6 3.5 - 5.2 g/dL    Albumin/Globulin Ratio 1.8 1.0 - 2.5    Total Bilirubin 0.2 (L) 0.3 - 1.2 mg/dL    Alkaline Phosphatase 59 35 - 104 U/L    ALT 11 5 - 33 U/L    AST 17 <32 U/L   Protime-INR    Collection Time: 05/10/24  3:05 PM   Result Value Ref Range    Protime 9.3 (L) 9.4 - 12.6 sec    INR 0.9    APTT    Collection Time: 05/10/24  3:05 PM   Result Value Ref Range    APTT 25.0 21.3 - 31.3 sec   Lactic Acid    Collection Time: 05/10/24  3:05 PM   Result Value Ref Range    Lactic Acid 1.3 0.5 - 2.2 mmol/L   COVID-19, Rapid    Collection Time: 05/10/24  4:24 PM    Specimen: Nasopharyngeal Swab   Result Value Ref Range    Specimen Description .NASOPHARYNGEAL SWAB     SARS-CoV-2, Rapid Not Detected Not Detected   Rapid influenza A/B antigens    Collection Time: 05/10/24  4:24 PM    Specimen: Nasopharyngeal   Result Value Ref Range    Flu A Antigen NEGATIVE NEGATIVE    Flu B Antigen NEGATIVE NEGATIVE   Urinalysis with Reflex to Culture    Collection Time: 05/10/24  5:05 PM    Specimen: Urine   Result Value Ref Range    Color, UA Yellow Yellow    Turbidity UA Clear Clear    Glucose, Ur NEGATIVE NEGATIVE mg/dL    Bilirubin, Urine NEGATIVE NEGATIVE    Ketones, Urine NEGATIVE NEGATIVE mg/dL    Specific Gravity, UA 1.015 1.005 - 1.030    Urine Hgb NEGATIVE NEGATIVE    pH, Urine 8.0 5.0 - 8.0    Protein, UA NEGATIVE NEGATIVE mg/dL

## 2024-05-11 NOTE — PLAN OF CARE
Problem: Discharge Planning  Goal: Discharge to home or other facility with appropriate resources  Outcome: Progressing  Flowsheets (Taken 5/10/2024 2200)  Discharge to home or other facility with appropriate resources: Identify barriers to discharge with patient and caregiver

## 2024-05-11 NOTE — PROGRESS NOTES
[facility]   Pharmacy Pharmacokinetic Monitoring Service - Vancomycin     Cally Ballesteros is a 43 y.o. female starting on vancomycin therapy for Meningitis. Pharmacy consulted by Isaias Robin  for monitoring and adjustment.    Target Concentration: Goal AUC/LEIDY 400-600 mg*hr/L    Additional Antimicrobials: Ceftriaxone    Pertinent Laboratory Values:   Wt Readings from Last 1 Encounters:   05/10/24 88 kg (194 lb 0.1 oz)     Temp Readings from Last 1 Encounters:   05/10/24 98.5 °F (36.9 °C) (Oral)     Estimated Creatinine Clearance: 119 mL/min (based on SCr of 0.7 mg/dL).  Recent Labs     05/10/24  1505   CREATININE 0.7   BUN 13   WBC 7.3     Procalcitonin:     Pertinent Cultures:  Culture Date Source Results        MRSA Nasal Swab: N/A. Non-respiratory infection.    Plan:  Dosing recommendations based on Bayesian software  Start vancomycin 1500 mg IV every 12 hours  Anticipated AUC of 538 and trough concentration of 14 at steady state  Renal labs as indicated      Pharmacy will continue to monitor patient and adjust therapy as indicated    Thank you for the consult,  Fernando Marmolejo RPH  5/10/2024 10:58 PM

## 2024-05-12 ENCOUNTER — APPOINTMENT (OUTPATIENT)
Dept: INTERVENTIONAL RADIOLOGY/VASCULAR | Age: 44
End: 2024-05-12
Attending: PSYCHIATRY & NEUROLOGY
Payer: MEDICAID

## 2024-05-12 LAB
ANION GAP SERPL CALCULATED.3IONS-SCNC: 9 MMOL/L (ref 9–16)
APPEARANCE CSF: CLEAR
BASOPHILS # BLD: 0.06 K/UL (ref 0–0.2)
BASOPHILS NFR BLD: 1 % (ref 0–2)
BUN SERPL-MCNC: 16 MG/DL (ref 6–20)
C GATTII+NEOFOR DNA CSF QL NAA+NON-PROBE: NOT DETECTED
CA-I BLD-SCNC: 1.01 MMOL/L (ref 1.13–1.33)
CALCIUM SERPL-MCNC: 7.6 MG/DL (ref 8.6–10.4)
CHLORIDE SERPL-SCNC: 110 MMOL/L (ref 98–107)
CMV DNA CSF QL NAA+NON-PROBE: NOT DETECTED
CO2 SERPL-SCNC: 20 MMOL/L (ref 20–31)
CREAT SERPL-MCNC: 0.7 MG/DL (ref 0.5–0.9)
E COLI K1 DNA CSF QL NAA+NON-PROBE: NOT DETECTED
EOSINOPHIL # BLD: 0.09 K/UL (ref 0–0.44)
EOSINOPHILS RELATIVE PERCENT: 1 % (ref 1–4)
ERYTHROCYTE [DISTWIDTH] IN BLOOD BY AUTOMATED COUNT: 14.2 % (ref 11.8–14.4)
EV RNA CSF QL NAA+NON-PROBE: NOT DETECTED
GFR, ESTIMATED: >90 ML/MIN/1.73M2
GLUCOSE CSF-MCNC: 60 MG/DL (ref 40–70)
GLUCOSE SERPL-MCNC: 106 MG/DL (ref 74–99)
GP B STREP DNA CSF QL NAA+NON-PROBE: NOT DETECTED
HAEM INFLU DNA CSF QL NAA+NON-PROBE: NOT DETECTED
HCT VFR BLD AUTO: 37.2 % (ref 36.3–47.1)
HGB BLD-MCNC: 11.7 G/DL (ref 11.9–15.1)
HHV6 DNA CSF QL NAA+NON-PROBE: NOT DETECTED
HSV1 DNA CSF QL NAA+NON-PROBE: NOT DETECTED
HSV2 DNA CSF QL NAA+NON-PROBE: NOT DETECTED
IMM GRANULOCYTES # BLD AUTO: 0.04 K/UL (ref 0–0.3)
IMM GRANULOCYTES NFR BLD: 0 %
L MONOCYTOG DNA CSF QL NAA+NON-PROBE: NOT DETECTED
LYMPHOCYTES NFR BLD: 3.87 K/UL (ref 1.1–3.7)
LYMPHOCYTES RELATIVE PERCENT: 36 % (ref 24–43)
MCH RBC QN AUTO: 29.3 PG (ref 25.2–33.5)
MCHC RBC AUTO-ENTMCNC: 31.5 G/DL (ref 28.4–34.8)
MCV RBC AUTO: 93 FL (ref 82.6–102.9)
MICROORGANISM SPEC CULT: NORMAL
MICROORGANISM SPEC CULT: NORMAL
MONOCYTES NFR BLD: 0.85 K/UL (ref 0.1–1.2)
MONOCYTES NFR BLD: 8 % (ref 3–12)
N MEN DNA CSF QL NAA+NON-PROBE: NOT DETECTED
NEUTROPHILS NFR BLD: 54 % (ref 36–65)
NEUTS SEG NFR BLD: 5.86 K/UL (ref 1.5–8.1)
NRBC BLD-RTO: 0 PER 100 WBC
NUC CELL # FLD MANUAL: 0 CELLS/UL
PARECHOVIRUS A RNA CSF QL NAA+NON-PROBE: NOT DETECTED
PLATELET # BLD AUTO: 224 K/UL (ref 138–453)
PMV BLD AUTO: 9.1 FL (ref 8.1–13.5)
POTASSIUM SERPL-SCNC: 3.9 MMOL/L (ref 3.7–5.3)
PROT CSF-MCNC: 31.4 MG/DL (ref 15–45)
RBC # BLD AUTO: 4 M/UL (ref 3.95–5.11)
RBC # FLD MANUAL: 1 CELLS/UL
S PNEUM DNA CSF QL NAA+NON-PROBE: NOT DETECTED
SERVICE CMNT-IMP: NORMAL
SERVICE CMNT-IMP: NORMAL
SODIUM SERPL-SCNC: 139 MMOL/L (ref 136–145)
SPECIMEN DESCRIPTION: NORMAL
SPECIMEN VOL CSF: ABNORMAL ML
TUBE # CSF: 3
VANCOMYCIN TROUGH SERPL-MCNC: 6.5 UG/ML (ref 10–20)
VZV DNA CSF QL NAA+NON-PROBE: NOT DETECTED
WBC OTHER # BLD: 10.8 K/UL (ref 3.5–11.3)
XANTHOCHROMIA CSF QL: ABNORMAL

## 2024-05-12 PROCEDURE — 2580000003 HC RX 258: Performed by: STUDENT IN AN ORGANIZED HEALTH CARE EDUCATION/TRAINING PROGRAM

## 2024-05-12 PROCEDURE — 6370000000 HC RX 637 (ALT 250 FOR IP)

## 2024-05-12 PROCEDURE — 2500000003 HC RX 250 WO HCPCS: Performed by: STUDENT IN AN ORGANIZED HEALTH CARE EDUCATION/TRAINING PROGRAM

## 2024-05-12 PROCEDURE — 62328 DX LMBR SPI PNXR W/FLUOR/CT: CPT

## 2024-05-12 PROCEDURE — 009U3ZX DRAINAGE OF SPINAL CANAL, PERCUTANEOUS APPROACH, DIAGNOSTIC: ICD-10-PCS | Performed by: RADIOLOGY

## 2024-05-12 PROCEDURE — 2580000003 HC RX 258

## 2024-05-12 PROCEDURE — 85027 COMPLETE CBC AUTOMATED: CPT

## 2024-05-12 PROCEDURE — 6360000002 HC RX W HCPCS

## 2024-05-12 PROCEDURE — 99232 SBSQ HOSP IP/OBS MODERATE 35: CPT | Performed by: PSYCHIATRY & NEUROLOGY

## 2024-05-12 PROCEDURE — 82330 ASSAY OF CALCIUM: CPT

## 2024-05-12 PROCEDURE — 80048 BASIC METABOLIC PNL TOTAL CA: CPT

## 2024-05-12 PROCEDURE — 80202 ASSAY OF VANCOMYCIN: CPT

## 2024-05-12 PROCEDURE — 2060000000 HC ICU INTERMEDIATE R&B

## 2024-05-12 PROCEDURE — 36415 COLL VENOUS BLD VENIPUNCTURE: CPT

## 2024-05-12 RX ORDER — PROCHLORPERAZINE EDISYLATE 5 MG/ML
10 INJECTION INTRAMUSCULAR; INTRAVENOUS ONCE
Status: COMPLETED | OUTPATIENT
Start: 2024-05-12 | End: 2024-05-12

## 2024-05-12 RX ORDER — DIPHENHYDRAMINE HYDROCHLORIDE 50 MG/ML
25 INJECTION INTRAMUSCULAR; INTRAVENOUS ONCE
Status: COMPLETED | OUTPATIENT
Start: 2024-05-12 | End: 2024-05-12

## 2024-05-12 RX ORDER — ACETAMINOPHEN 500 MG
1000 TABLET ORAL ONCE
Status: COMPLETED | OUTPATIENT
Start: 2024-05-12 | End: 2024-05-12

## 2024-05-12 RX ORDER — LORAZEPAM 2 MG/ML
2 INJECTION INTRAMUSCULAR ONCE
Status: COMPLETED | OUTPATIENT
Start: 2024-05-12 | End: 2024-05-12

## 2024-05-12 RX ADMIN — KETOROLAC TROMETHAMINE 15 MG: 15 INJECTION, SOLUTION INTRAMUSCULAR; INTRAVENOUS at 08:53

## 2024-05-12 RX ADMIN — ACYCLOVIR SODIUM 700 MG: 50 INJECTION, SOLUTION INTRAVENOUS at 04:30

## 2024-05-12 RX ADMIN — VALPROATE SODIUM 250 MG: 100 INJECTION, SOLUTION INTRAVENOUS at 02:37

## 2024-05-12 RX ADMIN — PROCHLORPERAZINE EDISYLATE 10 MG: 5 INJECTION INTRAMUSCULAR; INTRAVENOUS at 23:09

## 2024-05-12 RX ADMIN — SODIUM CHLORIDE: 9 INJECTION, SOLUTION INTRAVENOUS at 12:14

## 2024-05-12 RX ADMIN — VALPROATE SODIUM 250 MG: 100 INJECTION, SOLUTION INTRAVENOUS at 18:49

## 2024-05-12 RX ADMIN — ACETAMINOPHEN 1000 MG: 500 TABLET ORAL at 23:09

## 2024-05-12 RX ADMIN — VENLAFAXINE HYDROCHLORIDE 100 MG: 100 TABLET ORAL at 20:15

## 2024-05-12 RX ADMIN — PREGABALIN 100 MG: 100 CAPSULE ORAL at 20:16

## 2024-05-12 RX ADMIN — PROCHLORPERAZINE EDISYLATE 10 MG: 5 INJECTION INTRAMUSCULAR; INTRAVENOUS at 10:52

## 2024-05-12 RX ADMIN — VANCOMYCIN HYDROCHLORIDE 1500 MG: 1.5 INJECTION, POWDER, LYOPHILIZED, FOR SOLUTION INTRAVENOUS at 06:43

## 2024-05-12 RX ADMIN — Medication 2000 MG: at 23:15

## 2024-05-12 RX ADMIN — LORAZEPAM 2 MG: 2 INJECTION INTRAMUSCULAR; INTRAVENOUS at 16:47

## 2024-05-12 RX ADMIN — VALPROATE SODIUM 250 MG: 100 INJECTION, SOLUTION INTRAVENOUS at 10:46

## 2024-05-12 RX ADMIN — ACYCLOVIR SODIUM 700 MG: 50 INJECTION, SOLUTION INTRAVENOUS at 21:25

## 2024-05-12 RX ADMIN — VANCOMYCIN HYDROCHLORIDE 1750 MG: 10 INJECTION, POWDER, LYOPHILIZED, FOR SOLUTION INTRAVENOUS at 18:38

## 2024-05-12 RX ADMIN — ACYCLOVIR SODIUM 700 MG: 50 INJECTION, SOLUTION INTRAVENOUS at 12:16

## 2024-05-12 RX ADMIN — DIPHENHYDRAMINE HYDROCHLORIDE 25 MG: 50 INJECTION INTRAMUSCULAR; INTRAVENOUS at 23:09

## 2024-05-12 RX ADMIN — Medication 2000 MG: at 12:14

## 2024-05-12 RX ADMIN — POLYETHYLENE GLYCOL 3350 17 G: 17 POWDER, FOR SOLUTION ORAL at 09:06

## 2024-05-12 RX ADMIN — ACETAMINOPHEN 1000 MG: 500 TABLET ORAL at 10:49

## 2024-05-12 RX ADMIN — VENLAFAXINE HYDROCHLORIDE 100 MG: 100 TABLET ORAL at 08:53

## 2024-05-12 RX ADMIN — DIPHENHYDRAMINE HYDROCHLORIDE 25 MG: 50 INJECTION INTRAMUSCULAR; INTRAVENOUS at 10:52

## 2024-05-12 RX ADMIN — SODIUM CHLORIDE: 9 INJECTION, SOLUTION INTRAVENOUS at 21:24

## 2024-05-12 RX ADMIN — PREGABALIN 100 MG: 100 CAPSULE ORAL at 08:53

## 2024-05-12 ASSESSMENT — PAIN DESCRIPTION - DESCRIPTORS
DESCRIPTORS: CRUSHING;DISCOMFORT;ACHING
DESCRIPTORS: ACHING

## 2024-05-12 ASSESSMENT — PAIN SCALES - GENERAL
PAINLEVEL_OUTOF10: 6
PAINLEVEL_OUTOF10: 0
PAINLEVEL_OUTOF10: 4
PAINLEVEL_OUTOF10: 6
PAINLEVEL_OUTOF10: 4

## 2024-05-12 ASSESSMENT — PAIN DESCRIPTION - LOCATION
LOCATION: HEAD

## 2024-05-12 NOTE — PROGRESS NOTES
Pt arrives to room for LP  MC RT and SM RT to bedside  Dr See to bedside  Site prepped and draped  Medicated per order  Access obtained and draining clear csf  Tolerated well  Access removed and site covered with band aid  Report to Saad GILL    7ml removed

## 2024-05-12 NOTE — PLAN OF CARE
Problem: Discharge Planning  Goal: Discharge to home or other facility with appropriate resources  5/12/2024 0146 by Sneha Sanhcez, RN  Outcome: Progressing  5/11/2024 1838 by Sophie Sanchez, RN  Outcome: Progressing  5/11/2024 1838 by Sophie Sanchez, RN  Outcome: Progressing     Problem: Pain  Goal: Verbalizes/displays adequate comfort level or baseline comfort level  5/12/2024 0146 by Sneha Sanchez RN  Outcome: Progressing  Note: Need for further pain medication, however patient finally able to rest after migraine cocktail given.  5/11/2024 1838 by Sophie Sanchez, RN  Outcome: Progressing

## 2024-05-12 NOTE — PROGRESS NOTES
- 5.3 mmol/L    Chloride 107 98 - 107 mmol/L    CO2 19 (L) 20 - 31 mmol/L    Anion Gap 12 9 - 16 mmol/L    Glucose 151 (H) 74 - 99 mg/dL    BUN 14 6 - 20 mg/dL    Creatinine 0.8 0.50 - 0.90 mg/dL    Est, Glom Filt Rate >90 >60 mL/min/1.73m2    Calcium 8.0 (L) 8.6 - 10.4 mg/dL   Magnesium    Collection Time: 05/11/24  9:14 PM   Result Value Ref Range    Magnesium 2.6 1.6 - 2.6 mg/dL   Vancomycin Level, Trough    Collection Time: 05/12/24  5:43 AM   Result Value Ref Range    Vancomycin Tr 6.5 (L) 10.0 - 20.0 ug/mL   Basic Metabolic Panel w/ Reflex to MG    Collection Time: 05/12/24  5:43 AM   Result Value Ref Range    Sodium 139 136 - 145 mmol/L    Potassium 3.9 3.7 - 5.3 mmol/L    Chloride 110 (H) 98 - 107 mmol/L    CO2 20 20 - 31 mmol/L    Anion Gap 9 9 - 16 mmol/L    Glucose 106 (H) 74 - 99 mg/dL    BUN 16 6 - 20 mg/dL    Creatinine 0.7 0.50 - 0.90 mg/dL    Est, Glom Filt Rate >90 >60 mL/min/1.73m2    Calcium 7.6 (L) 8.6 - 10.4 mg/dL     Recent Labs     05/11/24  0345   WBC 11.1   RBC 4.63   HGB 13.4   HCT 42.2   MCV 91.1   MCH 28.9   MCHC 31.8   RDW 13.6      MPV 9.4     Recent Labs     05/10/24  1505 05/11/24  2114 05/12/24  0543      < > 139   K 3.9   < > 3.9      < > 110*   CO2 25   < > 20   BUN 13   < > 16   CREATININE 0.7   < > 0.7   GLUCOSE 110*   < > 106*   CALCIUM 9.2   < > 7.6*   BILITOT 0.2*  --   --    ALKPHOS 59  --   --    AST 17  --   --    ALT 11  --   --     < > = values in this interval not displayed.     No results found for: \"LABA1C\"    Assessment :      Primary Problem  Meningitis    Active Hospital Problems    Diagnosis Date Noted    Acute nonintractable headache [R51.9] 05/11/2024    Meningitis [G03.9] 05/10/2024         Plan:     Patient is a 43 y.o. female with past medical history of multiple episodes of meningitis presents for similar symptoms of headache, neck stiffness, and photophobia.     Headache and neck stiffness, concern for meningitis  - CT head: No acute  abnormality  - MRI brain W WO contrast: No acute disease  - s/p 2 bedside LP failed  - IR guided LP: Ordered  - Continue empiric therapy with acyclovir 10 mg/kg; Rocephin 2 g twice daily; vancomycin  -Toradol, Benadryl, Compazine as needed, Decadron for 3 doses for the headache    Lovenox on hold anticipating LP    Follow-up further recommendations after discussing case with the attending.  The plan was discussed with the patient, patient's family and the medical staff.   Consultations:   PHARMACY TO DOSE VANCOMYCIN    Patient is admitted as inpatient status because of co-morbidities listed above, severity of signs and symptoms as outlined, requirement for current medical therapies and most importantly because of direct risk to patient if care not provided in a hospital setting.    Mary Ball MD  Neurology Resident PGY-2   5/12/2024  8:58 AM    Copy sent to Dr. Eugene primary care provider on file.

## 2024-05-12 NOTE — PLAN OF CARE
Problem: Discharge Planning  Goal: Discharge to home or other facility with appropriate resources  5/12/2024 1930 by Prudencio Russell RN  Flowsheets (Taken 5/12/2024 0800)  Discharge to home or other facility with appropriate resources: Identify barriers to discharge with patient and caregiver  5/12/2024 1930 by Prudencio Russell RN  Outcome: Progressing  Flowsheets (Taken 5/12/2024 0800)  Discharge to home or other facility with appropriate resources: Identify barriers to discharge with patient and caregiver     Problem: Pain  Goal: Verbalizes/displays adequate comfort level or baseline comfort level  5/12/2024 1930 by Prudencio Russell RN  Flowsheets (Taken 5/12/2024 1930)  Verbalizes/displays adequate comfort level or baseline comfort level: Encourage patient to monitor pain and request assistance  5/12/2024 1930 by Prudencio Russell RN  Outcome: Progressing  Flowsheets (Taken 5/12/2024 1930)  Verbalizes/displays adequate comfort level or baseline comfort level: Encourage patient to monitor pain and request assistance

## 2024-05-12 NOTE — PROGRESS NOTES
[facility]   Pharmacy Pharmacokinetic Monitoring Service - Vancomycin    Consulting Provider: Dr. Isaias Davis   Indication: r/o meningitis   Target Concentration: Goal AUC/LEIDY 400-600 mg*hr/L  Day of Therapy: 3  Additional Antimicrobials: ceftriaxone, acyclovir     Pertinent Laboratory Values:   Wt Readings from Last 1 Encounters:   05/11/24 100 kg (220 lb 7.4 oz)     Temp Readings from Last 1 Encounters:   05/12/24 97.5 °F (36.4 °C) (Axillary)     Estimated Creatinine Clearance: 126 mL/min (based on SCr of 0.7 mg/dL).  Recent Labs     05/10/24  1505 05/11/24  0345 05/11/24  2114 05/12/24  0543   CREATININE 0.7  --  0.8 0.7   BUN 13  --  14 16   WBC 7.3 11.1  --   --        Pertinent Cultures:  Culture Date Source Results   5/10 Blood x2 No growth   5/12 CSF Ordered      Assessment:  Date/Time Current Dose Concentration Timing of Concentration (h)   5/12 0545 1500 mg IV q12h 6.5 12 h after last dose   Note: Serum concentrations collected for AUC dosing may appear elevated if collected in close proximity to the dose administered, this is not necessarily an indication of toxicity    Plan:  Renal function stable  Vancomycin level 6.5 mcg/ml, collected ~12 h after last dose; calculated AUC at steady state ~400  Current regimen is borderline therapeutic, but considering the indication will adjust dose to Vancomycin 1750 mg IV q12h with an anticipated AUC of 480 and trough of 12    Pharmacy will continue to monitor patient and adjust therapy as indicated    Thank you for the consult,  Andrzej Vaca, PharmD, 5/12/2024 7:35 AM

## 2024-05-13 VITALS
RESPIRATION RATE: 15 BRPM | BODY MASS INDEX: 35.43 KG/M2 | HEART RATE: 69 BPM | DIASTOLIC BLOOD PRESSURE: 74 MMHG | TEMPERATURE: 98.1 F | WEIGHT: 220.46 LBS | OXYGEN SATURATION: 97 % | HEIGHT: 66 IN | SYSTOLIC BLOOD PRESSURE: 123 MMHG

## 2024-05-13 LAB
ALBUMIN CSF-MCNC: 183 MG/L (ref 70–350)
ALBUMIN INDEX: 52.3
ALBUMIN SERPL-MCNC: 3.5 G/DL (ref 3.5–5.2)
ANA SER QL IA: NEGATIVE
ANCA MYELOPEROXIDASE: <0.3 AU/ML (ref 0–3.5)
ANCA PROTEINASE 3: <0.7 AU/ML (ref 0–2)
ANION GAP SERPL CALCULATED.3IONS-SCNC: 8 MMOL/L (ref 9–16)
BASOPHILS # BLD: 0.06 K/UL (ref 0–0.2)
BASOPHILS NFR BLD: 1 % (ref 0–2)
BUN SERPL-MCNC: 10 MG/DL (ref 6–20)
CALCIUM SERPL-MCNC: 7.8 MG/DL (ref 8.6–10.4)
CHLORIDE SERPL-SCNC: 111 MMOL/L (ref 98–107)
CO2 SERPL-SCNC: 20 MMOL/L (ref 20–31)
CREAT SERPL-MCNC: 0.6 MG/DL (ref 0.5–0.9)
DSDNA IGG SER QL IA: 0.7 IU/ML
EOSINOPHIL # BLD: 0.16 K/UL (ref 0–0.44)
EOSINOPHILS RELATIVE PERCENT: 1 % (ref 1–4)
ERYTHROCYTE [DISTWIDTH] IN BLOOD BY AUTOMATED COUNT: 14.5 % (ref 11.8–14.4)
GFR, ESTIMATED: >90 ML/MIN/1.73M2
GLUCOSE SERPL-MCNC: 81 MG/DL (ref 74–99)
HCT VFR BLD AUTO: 38.8 % (ref 36.3–47.1)
HGB BLD-MCNC: 11.9 G/DL (ref 11.9–15.1)
IGG CSF-MCNC: <0.4 MG/DL (ref 0.5–7)
IGG INDEX CSF: ABNORMAL
IGG SERPL-MCNC: 684 MG/DL (ref 700–1600)
IGG SYNTHESIS RATE CSF: ABNORMAL MG/24 H
IMM GRANULOCYTES # BLD AUTO: 0.05 K/UL (ref 0–0.3)
IMM GRANULOCYTES NFR BLD: 0 %
LYMPHOCYTES NFR BLD: 4.22 K/UL (ref 1.1–3.7)
LYMPHOCYTES RELATIVE PERCENT: 38 % (ref 24–43)
MCH RBC QN AUTO: 29 PG (ref 25.2–33.5)
MCHC RBC AUTO-ENTMCNC: 30.7 G/DL (ref 28.4–34.8)
MCV RBC AUTO: 94.6 FL (ref 82.6–102.9)
MONOCYTES NFR BLD: 0.83 K/UL (ref 0.1–1.2)
MONOCYTES NFR BLD: 7 % (ref 3–12)
NEUTROPHILS NFR BLD: 53 % (ref 36–65)
NEUTS SEG NFR BLD: 5.92 K/UL (ref 1.5–8.1)
NRBC BLD-RTO: 0 PER 100 WBC
NUCLEAR IGG SER IA-RTO: 0.2 U/ML
OLIGOCLONAL BANDS: ABNORMAL
PLATELET # BLD AUTO: 212 K/UL (ref 138–453)
PMV BLD AUTO: 9.3 FL (ref 8.1–13.5)
POTASSIUM SERPL-SCNC: 3.7 MMOL/L (ref 3.7–5.3)
RBC # BLD AUTO: 4.1 M/UL (ref 3.95–5.11)
RBC # BLD: ABNORMAL 10*6/UL
SODIUM SERPL-SCNC: 139 MMOL/L (ref 136–145)
WBC OTHER # BLD: 11.2 K/UL (ref 3.5–11.3)

## 2024-05-13 PROCEDURE — 99239 HOSP IP/OBS DSCHRG MGMT >30: CPT | Performed by: PSYCHIATRY & NEUROLOGY

## 2024-05-13 PROCEDURE — 99232 SBSQ HOSP IP/OBS MODERATE 35: CPT | Performed by: PSYCHIATRY & NEUROLOGY

## 2024-05-13 PROCEDURE — 6360000002 HC RX W HCPCS

## 2024-05-13 PROCEDURE — 6370000000 HC RX 637 (ALT 250 FOR IP)

## 2024-05-13 PROCEDURE — 2500000003 HC RX 250 WO HCPCS: Performed by: STUDENT IN AN ORGANIZED HEALTH CARE EDUCATION/TRAINING PROGRAM

## 2024-05-13 PROCEDURE — 2500000003 HC RX 250 WO HCPCS

## 2024-05-13 PROCEDURE — 80048 BASIC METABOLIC PNL TOTAL CA: CPT

## 2024-05-13 PROCEDURE — 36415 COLL VENOUS BLD VENIPUNCTURE: CPT

## 2024-05-13 PROCEDURE — 2580000003 HC RX 258: Performed by: STUDENT IN AN ORGANIZED HEALTH CARE EDUCATION/TRAINING PROGRAM

## 2024-05-13 PROCEDURE — 2580000003 HC RX 258

## 2024-05-13 PROCEDURE — 85025 COMPLETE CBC W/AUTO DIFF WBC: CPT

## 2024-05-13 RX ORDER — CYCLOBENZAPRINE HCL 10 MG
10 TABLET ORAL ONCE
Status: COMPLETED | OUTPATIENT
Start: 2024-05-13 | End: 2024-05-13

## 2024-05-13 RX ORDER — LIDOCAINE 4 G/G
1 PATCH TOPICAL DAILY
Status: DISCONTINUED | OUTPATIENT
Start: 2024-05-13 | End: 2024-05-13 | Stop reason: HOSPADM

## 2024-05-13 RX ORDER — BUTALBITAL, ACETAMINOPHEN AND CAFFEINE 50; 325; 40 MG/1; MG/1; MG/1
1 TABLET ORAL EVERY 6 HOURS PRN
Qty: 28 TABLET | Refills: 0 | Status: SHIPPED | OUTPATIENT
Start: 2024-05-13 | End: 2024-05-20

## 2024-05-13 RX ORDER — DIPHENHYDRAMINE HCL 25 MG
25 CAPSULE ORAL EVERY 6 HOURS PRN
Qty: 40 CAPSULE | Refills: 0 | Status: SHIPPED | OUTPATIENT
Start: 2024-05-13 | End: 2024-05-23

## 2024-05-13 RX ORDER — METOCLOPRAMIDE HYDROCHLORIDE 5 MG/ML
10 INJECTION INTRAMUSCULAR; INTRAVENOUS ONCE
Status: COMPLETED | OUTPATIENT
Start: 2024-05-13 | End: 2024-05-13

## 2024-05-13 RX ORDER — CALCIUM GLUCONATE 20 MG/ML
2000 INJECTION, SOLUTION INTRAVENOUS ONCE
Status: COMPLETED | OUTPATIENT
Start: 2024-05-13 | End: 2024-05-13

## 2024-05-13 RX ORDER — CYCLOBENZAPRINE HCL 10 MG
10 TABLET ORAL 3 TIMES DAILY PRN
Qty: 21 TABLET | Refills: 0 | Status: SHIPPED | OUTPATIENT
Start: 2024-05-13 | End: 2024-05-23

## 2024-05-13 RX ORDER — DIPHENHYDRAMINE HYDROCHLORIDE 50 MG/ML
25 INJECTION INTRAMUSCULAR; INTRAVENOUS ONCE
Status: COMPLETED | OUTPATIENT
Start: 2024-05-13 | End: 2024-05-13

## 2024-05-13 RX ORDER — TOPIRAMATE 25 MG/1
25 TABLET ORAL 2 TIMES DAILY
Qty: 28 TABLET | Refills: 0 | Status: SHIPPED | OUTPATIENT
Start: 2024-05-13 | End: 2024-05-27

## 2024-05-13 RX ORDER — ACETAMINOPHEN 500 MG
1000 TABLET ORAL ONCE
Status: COMPLETED | OUTPATIENT
Start: 2024-05-13 | End: 2024-05-13

## 2024-05-13 RX ORDER — METOCLOPRAMIDE 10 MG/1
10 TABLET ORAL 3 TIMES DAILY PRN
Qty: 21 TABLET | Refills: 0 | Status: SHIPPED | OUTPATIENT
Start: 2024-05-13 | End: 2024-05-20

## 2024-05-13 RX ADMIN — VANCOMYCIN HYDROCHLORIDE 1750 MG: 10 INJECTION, POWDER, LYOPHILIZED, FOR SOLUTION INTRAVENOUS at 05:57

## 2024-05-13 RX ADMIN — CYCLOBENZAPRINE 10 MG: 10 TABLET, FILM COATED ORAL at 12:48

## 2024-05-13 RX ADMIN — CALCIUM GLUCONATE 2000 MG: 20 INJECTION, SOLUTION INTRAVENOUS at 03:34

## 2024-05-13 RX ADMIN — Medication 2000 MG: at 10:37

## 2024-05-13 RX ADMIN — VALPROATE SODIUM 250 MG: 100 INJECTION, SOLUTION INTRAVENOUS at 02:22

## 2024-05-13 RX ADMIN — VENLAFAXINE HYDROCHLORIDE 100 MG: 100 TABLET ORAL at 08:01

## 2024-05-13 RX ADMIN — SODIUM CHLORIDE: 9 INJECTION, SOLUTION INTRAVENOUS at 05:55

## 2024-05-13 RX ADMIN — VALPROATE SODIUM 250 MG: 100 INJECTION, SOLUTION INTRAVENOUS at 10:46

## 2024-05-13 RX ADMIN — PREGABALIN 100 MG: 100 CAPSULE ORAL at 08:01

## 2024-05-13 RX ADMIN — METOCLOPRAMIDE 10 MG: 5 INJECTION, SOLUTION INTRAMUSCULAR; INTRAVENOUS at 10:37

## 2024-05-13 RX ADMIN — DIPHENHYDRAMINE HYDROCHLORIDE 25 MG: 50 INJECTION INTRAMUSCULAR; INTRAVENOUS at 10:37

## 2024-05-13 RX ADMIN — ACYCLOVIR SODIUM 700 MG: 50 INJECTION, SOLUTION INTRAVENOUS at 03:44

## 2024-05-13 RX ADMIN — ACYCLOVIR SODIUM 700 MG: 50 INJECTION, SOLUTION INTRAVENOUS at 12:49

## 2024-05-13 RX ADMIN — ACETAMINOPHEN 1000 MG: 500 TABLET ORAL at 10:37

## 2024-05-13 ASSESSMENT — PAIN SCALES - GENERAL: PAINLEVEL_OUTOF10: 3

## 2024-05-13 ASSESSMENT — PAIN DESCRIPTION - LOCATION: LOCATION: HEAD

## 2024-05-13 ASSESSMENT — ENCOUNTER SYMPTOMS
APNEA: 0
TROUBLE SWALLOWING: 0
ABDOMINAL DISTENTION: 0
EYE REDNESS: 0
SORE THROAT: 0
COLOR CHANGE: 0

## 2024-05-13 NOTE — PROGRESS NOTES
CLINICAL PHARMACY NOTE: MEDS TO BEDS    Total # of Prescriptions Filled: 5   The following medications were delivered to the patient:  Topiramate  Metoclopramide  Cyclobenzaprine  Banophen  Butalbital appap caff    Additional Documentation:  No copay

## 2024-05-13 NOTE — PLAN OF CARE
Problem: Discharge Planning  Goal: Discharge to home or other facility with appropriate resources  5/13/2024 0950 by Leno Hansen, RN  Outcome: Progressing  5/12/2024 2219 by Derrek Curran, RN  Outcome: Progressing     Problem: Pain  Goal: Verbalizes/displays adequate comfort level or baseline comfort level  5/13/2024 0950 by Leno Hansen, RN  Outcome: Progressing  5/12/2024 2219 by Derrek Curran, RN  Outcome: Progressing

## 2024-05-13 NOTE — PROGRESS NOTES
Writer messaged provider on call Dr. Banda regarding patients complaints of migraines and that she has been receiving 1 gram of Tylenol, 25 mg IV Benadryl, and 10 mg of Compazine at 11 am and 11pm. Order placed by provider to be given at 2300.

## 2024-05-13 NOTE — CARE COORDINATION
Case Management Assessment  Initial Evaluation    Date/Time of Evaluation: 5/13/2024 3:27 PM  Assessment Completed by: Ilana Rose RN    If patient is discharged prior to next notation, then this note serves as note for discharge by case management.    Patient Name: Cally Ballesteros                   YOB: 1980  Diagnosis: Meningitis [G03.9]                   Date / Time: 5/10/2024  9:52 PM    Patient Admission Status: Inpatient   Readmission Risk (Low < 19, Mod (19-27), High > 27): Readmission Risk Score: 6.2    Current PCP: No primary care provider on file.  PCP verified by CM? (P) Yes (Cyn Weeks NP)    Chart Reviewed: Yes      History Provided by: Medical Record  Patient Orientation: Alert and Oriented, Person, Place, Situation    Patient Cognition: Alert    Hospitalization in the last 30 days (Readmission):  No    If yes, Readmission Assessment in CM Navigator will be completed.    Advance Directives:      Code Status: Full Code   Patient's Primary Decision Maker is: Patient Declined (Legal Next of Kin Remains as Decision Maker)      Discharge Planning:    Patient lives with: (P) Spouse/Significant Other Type of Home: (P) House  Primary Care Giver: Self  Patient Support Systems include: Spouse/Significant Other, Family Members   Current Financial resources: (P) Medicaid  Current community resources: (P) None  Current services prior to admission: (P) None            Current DME:              Type of Home Care services:  (P) None    ADLS  Prior functional level: (P) Independent in ADLs/IADLs  Current functional level: (P) Independent in ADLs/IADLs    PT AM-PAC:   /24  OT AM-PAC:   /24    Family can provide assistance at DC: (P) No  Would you like Case Management to discuss the discharge plan with any other family members/significant others, and if so, who? (P) No  Plans to Return to Present Housing: (P) Yes  Other Identified Issues/Barriers to RETURNING to current housing: na  Potential

## 2024-05-13 NOTE — PLAN OF CARE
Problem: Discharge Planning  Goal: Discharge to home or other facility with appropriate resources  5/12/2024 2219 by Derrek Curran RN  Outcome: Progressing  5/12/2024 1930 by Prudencio Russell RN  Flowsheets (Taken 5/12/2024 0800)  Discharge to home or other facility with appropriate resources: Identify barriers to discharge with patient and caregiver  5/12/2024 1930 by Prudencio Russell RN  Outcome: Progressing  Flowsheets (Taken 5/12/2024 0800)  Discharge to home or other facility with appropriate resources: Identify barriers to discharge with patient and caregiver     Problem: Pain  Goal: Verbalizes/displays adequate comfort level or baseline comfort level  5/12/2024 2219 by Derrek Curran RN  Outcome: Progressing  5/12/2024 1930 by Prudencio Russell RN  Flowsheets (Taken 5/12/2024 1930)  Verbalizes/displays adequate comfort level or baseline comfort level: Encourage patient to monitor pain and request assistance  5/12/2024 1930 by Prudencio Russell RN  Outcome: Progressing  Flowsheets (Taken 5/12/2024 1930)  Verbalizes/displays adequate comfort level or baseline comfort level: Encourage patient to monitor pain and request assistance

## 2024-05-13 NOTE — PLAN OF CARE
Problem: Discharge Planning  Goal: Discharge to home or other facility with appropriate resources  5/13/2024 1400 by Leno Hansen, RN  Outcome: Completed  5/13/2024 0950 by Leno Hansen, RN  Outcome: Progressing     Problem: Pain  Goal: Verbalizes/displays adequate comfort level or baseline comfort level  5/13/2024 1400 by Leno Hansen, RN  Outcome: Completed  5/13/2024 0950 by Leno Hansen, RN  Outcome: Progressing

## 2024-05-13 NOTE — DISCHARGE SUMMARY
Neurology Discharge Summary     Patient ID: Cally Ballesteros  :  1980   MRN: [id number]     ACCOUNT:  [id number]   Patient's PCP: No primary care provider on file.  Admit Date: 5/10/2024   Discharge Date: 2024     Length of Stay: 3  Code Status:  Full Code  Admitting Physician: Ru Mike MD  Discharge Physician: Mary Ball MD     Active Discharge Diagnoses:       Primary Problem  Meningitis      Hospital Problems  Active Hospital Problems    Diagnosis Date Noted    Acute nonintractable headache [R51.9] 2024    Meningitis [G03.9] 05/10/2024       Condition on the discharge: good     Hospital Stay:     Hospital Course:  Cally Ballesteros is a 43 y.o. female who was admitted for the management of  concern for  Meningitis , presented to ER with headache and neck pain and stiffness.    She initially presented to [place] ED on 5/10 for the symptoms of worsening headache along with photophobia and neck stiffness.  The symptoms started for the past 4 days, and have worsened.  She has tried many over-the-counter medications, however they have not worked.  There were attempts to obtain lumbar puncture in the ER, however they were unsuccessful.  She denies symptoms of nausea or vomiting.  She was started on broad-spectrum antibiotics with ceftriaxone and vancomycin as well as on acyclovir.  Patient has remained afebrile.  She was transferred to [place] for further management.     Patient was seen and examined at bedside.  She complains of severe neck stiffness along with severe headache rated at 10/10.  She has limited motion of her eyes due to pain.  Patient was given migraine cocktail consisting of Toradol, Compazine, Benadryl, magnesium, and IV fluids.  This improved her headache, and allowed her to sleep.    CT head without IV contrast done on 5/10/2024 shows no acute intracranial abnormality.  MRI brain with and without contrast was unremarkable.  Blood workup was not suggestive for

## 2024-05-13 NOTE — PROGRESS NOTES
[facility] Neurology   IN-PATIENT SERVICE   [facility]    Progress Note             Date:   5/13/2024  Patient name:  Cally Ballesteros  Date of admission:  5/10/2024  9:52 PM  MRN:   [id number]  Account:  [id number]  YOB: 1980  PCP:    No primary care provider on file.  Room:   [address] [place] [address]  Code Status:    Full Code    Chief Complaint:     Headache, neck stiffness    Interval hx:     The patient was seen and examined at bedside. Is vitally stable, alert and oriented x 3. No acute events overnight.  Patient underwent IR guided LP; CSF analysis revealed WBC of 0, normal protein, meningitis encephalitis panel are negative.  Patient continues to complain of headache and neck soreness  Headache is improved after IV Benadryl, Compazine 1 g of Tylenol.  Lidocaine patch for the neck stiffness is applied    Brief History of Present Illness:     Patient is a 43-year-old female with past medical history of multiple episodes of meningitis presenting for similar symptoms of worsening headache, photophobia, neck stiffness.     She initially presented to [place] emergency department for the symptoms of worsening headache along with photophobia and neck stiffness.  The symptoms started for the past 4 days, and have worsened.  She has tried many over-the-counter medications, however they have not worked.  There were attempts to obtain lumbar puncture in the ER, however they were unsuccessful.  She denies symptoms of nausea or vomiting.  She was transferred to [place] for further management.     Patient was seen and examined at bedside.  She complains of severe neck stiffness along with severe headache rated at 10/10.  She has limited motion of her eyes due to pain.  Patient is sitting very still in the hospital bed, and prefers to be in the dark due to light sensitivity.  She denies experiencing other focal neurological deficits or symptoms.  Patient was given migraine cocktail consisting of  for 3 doses for the headache    Lovenox on hold anticipating LP    Follow-up further recommendations after discussing case with the attending.  The plan was discussed with the patient, patient's family and the medical staff.   Consultations:   PHARMACY TO DOSE VANCOMYCIN    Patient is admitted as inpatient status because of co-morbidities listed above, severity of signs and symptoms as outlined, requirement for current medical therapies and most importantly because of direct risk to patient if care not provided in a hospital setting.    Mary Ball MD  Neurology Resident PGY-2   5/13/2024  3:36 PM    Copy sent to Dr. Eugene primary care provider on file.

## 2024-05-14 LAB
ACE SERPL-CCNC: 24 U/L (ref 16–85)
QUANTI TB GOLD PLUS: NEGATIVE
QUANTI TB1 MINUS NIL: 0 IU/ML (ref 0–0.34)
QUANTI TB2 MINUS NIL: 0 IU/ML (ref 0–0.34)
QUANTIFERON MITOGEN: 2.07 IU/ML
QUANTIFERON NIL: 0.01 IU/ML
VDRL CSF QL: NONREACTIVE

## 2024-05-15 LAB
CSF ISOELECTRIC FOCUSING INTERPRETATION: NORMAL
MBP CSF-MCNC: 1.6 NG/ML (ref 0–5.5)
MICROORGANISM SPEC CULT: NORMAL
MICROORGANISM/AGENT SPEC: NORMAL
OLIGOCLONAL BANDS CSF IEF: 0 BANDS (ref 0–1)
OLIGOCLONAL BANDS: NEGATIVE
SPECIMEN DESCRIPTION: NORMAL
WEST NILE IGG, CSF: 0.22 IV
WEST NILE IGM ANTIBODY CSF: 0.04 IV

## 2024-05-16 LAB
B BURGDOR AB CSF IA-ACNC: 0.07 IV
MICROORGANISM SPEC CULT: NORMAL
MICROORGANISM SPEC CULT: NORMAL
SERVICE CMNT-IMP: NORMAL
SERVICE CMNT-IMP: NORMAL
SPECIMEN DESCRIPTION: NORMAL
SPECIMEN DESCRIPTION: NORMAL

## 2024-05-21 ENCOUNTER — TELEPHONE (OUTPATIENT)
Dept: NEUROLOGY | Age: 44
End: 2024-05-21

## 2024-05-21 NOTE — TELEPHONE ENCOUNTER
Pt called and stated you seen her in the hospital recently and pt needs a letter for her to return back to work on Thursday 05/23/2024. Is it ok for us to write a letter? Fax number is [phone]/ [phone] attention Perla holdereness Please advise

## 2024-05-22 NOTE — TELEPHONE ENCOUNTER
Created new communication under the letters tab, letter to be printed and faxed by DORETHA Maya. Thank you!

## 2024-05-23 ENCOUNTER — TELEPHONE (OUTPATIENT)
Dept: NEUROLOGY | Age: 44
End: 2024-05-23

## 2024-05-23 NOTE — TELEPHONE ENCOUNTER
Pt called back again stated the work note needs to say she was excuse from work between these dates 05/10/24-05/22/24 and she can return back to work on 05/23/2024. Please advise

## 2024-05-23 NOTE — TELEPHONE ENCOUNTER
Call pt and lvm to let pt know letter has been redone and faxed it to the fax numbers that she gave me yesterday.

## 2024-05-24 ENCOUNTER — TELEPHONE (OUTPATIENT)
Dept: NEUROLOGY | Age: 44
End: 2024-05-24

## 2024-05-24 DIAGNOSIS — G44.209 ACUTE NON INTRACTABLE TENSION-TYPE HEADACHE: Primary | ICD-10-CM

## 2024-05-24 NOTE — TELEPHONE ENCOUNTER
Pt called and stated since she has been on a lot of medications she now has a yeast infection and she has constipation. Pt has no pcp. She wanting to know if something can be called in for those issues to [facility] in [facility]. Please advise

## 2024-05-31 NOTE — TELEPHONE ENCOUNTER
Unfortunately, I can not address those issues. The most appropriate course of actions is to establish care with PCP and schedule an appointment. I placed a referral to the family medicine clinic under this telephone encounter, would be appreciative if you call and update the patient. Thank you!

## 2024-06-03 ENCOUNTER — HOSPITAL ENCOUNTER (OUTPATIENT)
Age: 44
Setting detail: SPECIMEN
Discharge: HOME | End: 2024-06-03

## 2024-06-03 ENCOUNTER — OFFICE VISIT (OUTPATIENT)
Dept: OBGYN CLINIC | Age: 44
End: 2024-06-03
Payer: MEDICAID

## 2024-06-03 VITALS
HEIGHT: 66 IN | DIASTOLIC BLOOD PRESSURE: 64 MMHG | BODY MASS INDEX: 33.59 KG/M2 | WEIGHT: 209 LBS | SYSTOLIC BLOOD PRESSURE: 122 MMHG

## 2024-06-03 DIAGNOSIS — Z12.31 ENCOUNTER FOR SCREENING MAMMOGRAM FOR MALIGNANT NEOPLASM OF BREAST: ICD-10-CM

## 2024-06-03 DIAGNOSIS — Z01.419 WELL WOMAN EXAM WITH ROUTINE GYNECOLOGICAL EXAM: Primary | ICD-10-CM

## 2024-06-03 DIAGNOSIS — Z12.4 SCREENING FOR CERVICAL CANCER: ICD-10-CM

## 2024-06-03 DIAGNOSIS — Z98.890 HISTORY OF ENDOMETRIAL ABLATION: ICD-10-CM

## 2024-06-03 DIAGNOSIS — Z01.419 VISIT FOR GYNECOLOGIC EXAMINATION: ICD-10-CM

## 2024-06-03 DIAGNOSIS — N39.3 STRESS INCONTINENCE IN FEMALE: ICD-10-CM

## 2024-06-03 PROCEDURE — 99386 PREV VISIT NEW AGE 40-64: CPT | Performed by: STUDENT IN AN ORGANIZED HEALTH CARE EDUCATION/TRAINING PROGRAM

## 2024-06-03 PROCEDURE — 99204 OFFICE O/P NEW MOD 45 MIN: CPT | Performed by: STUDENT IN AN ORGANIZED HEALTH CARE EDUCATION/TRAINING PROGRAM

## 2024-06-03 NOTE — PROGRESS NOTES
[facility] Annual Visit    Cally Ballesteros  6/3/2024                       Primary Care Physician: No primary care provider on file.    CC:   Chief Complaint   Patient presents with    Establish Care     Last pap- unsure how long          HPI: Cally Ballesteros is a 43 y.o. female     The patient was seen and examined. Her No LMP recorded. Patient has had an ablation..   She is here for an annual visit. She reports history of endometrial ablation 11 years ago for heavy menses. States no menses since that time. Does not have permanent or long acting birth control. Partner does not have vasectomy. Discussed life threatening risk of pregnancy following an ablation. Discussed options for contraceptives and patient to decide.     Also reports urinary incontinence with leakage when ambulating as well as cough/laugh/sneeze. Present for 10 years (since the birth of her child) and worsening. One vaginal delivery 7 pounds. States sister had bladder sling and helped. Mother had something as well for incontinence. Patient tried pelvic floor PT without improvement.    Menarche: 15  Menopause: N/A    Bowel habits are regular.   Denies any bloating.    Denies dysuria.   complains of urinary leaking.    denies vaginal discharge.    is sexually active: male partner (one)      Gynecologic History:  -STD History: no past history  -Pap History: Patient states all normal paps  -History high grade pap? no  -Colposcopy History: denies  -Permanent Sterilization: no  -Hormone Replacement Exposure: no    Health Maintenance:  Garidisil immunization: discussed  Date of Last Mammogram: has never had mammogram. Discussed initiating screening mammograms  Date of Last Colonoscopy: n/a  Date of Last Bone Density:  n/a    Review of Symptoms:   -Constitutional: (-) fever, (-) chills  -HEENT: (-) visual disturbances, (-)headaches  -Respiratory: (-) dyspnea, (-) cough  -Cardiovascular: (-) chest pain, (-) palpitations  -Gastrointestinal:

## 2024-06-04 NOTE — TELEPHONE ENCOUNTER
I spoke with pt and let her know that she was not able to address those issues. I let her know a referral was placed for family medicine clinic and to please establish care with a PCP.

## 2024-06-05 LAB
HPV I/H RISK 4 DNA CVX QL NAA+PROBE: NOT DETECTED
HPV SAMPLE: NORMAL
HPV, INTERPRETATION: NORMAL
HPV16 DNA CVX QL NAA+PROBE: NOT DETECTED
HPV18 DNA CVX QL NAA+PROBE: NOT DETECTED
SPECIMEN DESCRIPTION: NORMAL

## 2024-06-09 LAB — CYTOLOGY REPORT: NORMAL

## 2024-07-03 ENCOUNTER — OFFICE VISIT (OUTPATIENT)
Dept: NEUROLOGY | Age: 44
End: 2024-07-03
Payer: MEDICAID

## 2024-07-03 DIAGNOSIS — Z86.61 HISTORY OF MENINGITIS: ICD-10-CM

## 2024-07-03 DIAGNOSIS — G44.209 ACUTE NON INTRACTABLE TENSION-TYPE HEADACHE: Primary | ICD-10-CM

## 2024-07-03 PROCEDURE — 99214 OFFICE O/P EST MOD 30 MIN: CPT

## 2024-07-03 RX ORDER — TIZANIDINE 2 MG/1
2 TABLET ORAL NIGHTLY PRN
Qty: 10 TABLET | Refills: 3 | Status: SHIPPED | OUTPATIENT
Start: 2024-07-03

## 2024-07-03 RX ORDER — TIZANIDINE 2 MG/1
2 TABLET ORAL NIGHTLY PRN
Qty: 10 TABLET | Refills: 0 | Status: SHIPPED | OUTPATIENT
Start: 2024-07-03 | End: 2024-07-03

## 2024-07-03 RX ORDER — BUTALBITAL, ACETAMINOPHEN AND CAFFEINE 50; 325; 40 MG/1; MG/1; MG/1
1 TABLET ORAL EVERY 6 HOURS PRN
Qty: 28 TABLET | Refills: 0 | Status: SHIPPED | OUTPATIENT
Start: 2024-07-03 | End: 2024-07-10

## 2024-07-03 ASSESSMENT — ENCOUNTER SYMPTOMS
ABDOMINAL DISTENTION: 0
COLOR CHANGE: 0

## 2024-07-03 NOTE — PROGRESS NOTES
Attending Physician Statement  I have discussed the case of Cally Ballesteros including pertinent history and exam findings with the resident/ CNP.  I reviewed medications, clinical labs, x-rays and other diagnostic tests with the resident/ CNP. I have seen and examined the patient and the key elements of the encounter have been performed by me. I agree with the assessment, plan and orders as documented by the resident.       MRI brain (with/without) 5/10/2024: No acute intracranial abnormalities.    CSF 5/11/2024: RBC 1, WBC 0, glucose 60, protein 31.4, MBP 1.60.  OCB negative.  VDRL nonreactive      Impression and Plan: Ms. Cally Ballesteros is a 43 y.o. female with   Recent hospitalization in May 2024 for intractable migraines with concern for meningitis.  Imaging studies and spinal tap were unrevealing.    Patient was discharged to home on Topamax 25 mg twice daily for headache prophylaxis.  Patient presented to clinic stating that headaches significantly improved and she doesn't want to be on daily prophylactic medsications as she did not have any headache in month of june.  Comorbid generalized anxiety disorder: Presently she is on Pregabalin 100 mg twice daily & Venlafaxine  mg daily as per psych.  Regarding Migraines: to take Fioricet one tab q48 hr prn  Regarding tension type headaches with muscle spasms: to take Tizandine 2mg qhs prn.  Follow up in 4-6 months.      This note was partially created using voice recognition software and is inherently subject to errors including those of syntax and \"sound alike\" substitutions which may escape proofreading.  In such instances, original meaning may be extrapolated by contextual derivation.  Delfina Dawson MD 7/3/2024 3:30 PM     
treated with migraine cocktail with improved in her symptoms and episode was deemed to new onset migraine. Patient denies having frequent headaches before and states that she has not had headache since the discharge. Endorses longstanding history of neck stiffness          - No indications for prophylactic therapy at this time   - Fioricet PRN    - Follow up on MRI cervical spine   - Tizanidine 2 mg PRN nighty       Ms. Ballesteros received counseling on the following healthy behaviors: medical compliance, smoking cessation, blood pressure control, regular follow up with primary doctor.        Electronically signed by Mary Ball MD on 7/3/2024 at 3:13 PM

## 2024-07-24 ENCOUNTER — HOSPITAL ENCOUNTER (OUTPATIENT)
Dept: MAMMOGRAPHY | Age: 44
Discharge: HOME | End: 2024-07-26
Payer: MEDICAID

## 2024-07-24 VITALS — HEIGHT: 66 IN | WEIGHT: 185 LBS | BODY MASS INDEX: 29.73 KG/M2

## 2024-07-24 DIAGNOSIS — Z12.31 ENCOUNTER FOR SCREENING MAMMOGRAM FOR MALIGNANT NEOPLASM OF BREAST: ICD-10-CM

## 2024-07-24 PROCEDURE — 77063 BREAST TOMOSYNTHESIS BI: CPT

## 2024-07-24 RX ORDER — BUSPIRONE HYDROCHLORIDE 5 MG/1
5 TABLET ORAL 2 TIMES DAILY
COMMUNITY
Start: 2024-06-12

## 2024-07-24 RX ORDER — CYCLOBENZAPRINE HCL 10 MG
10 TABLET ORAL 3 TIMES DAILY PRN
COMMUNITY

## 2024-07-24 RX ORDER — VENLAFAXINE 100 MG/1
100 TABLET ORAL 2 TIMES DAILY WITH MEALS
COMMUNITY
Start: 2024-04-22

## 2024-07-24 RX ORDER — LAMOTRIGINE 25 MG/1
25 TABLET ORAL NIGHTLY
COMMUNITY
Start: 2024-05-21

## 2024-07-24 RX ORDER — BUSPIRONE HYDROCHLORIDE 10 MG/1
10 TABLET ORAL 2 TIMES DAILY
COMMUNITY
Start: 2024-06-26

## 2024-07-24 RX ORDER — CLONAZEPAM 0.5 MG/1
0.5 TABLET ORAL 2 TIMES DAILY PRN
COMMUNITY
Start: 2024-07-05

## 2024-07-24 RX ORDER — METFORMIN HYDROCHLORIDE 500 MG/1
500 TABLET, EXTENDED RELEASE ORAL
COMMUNITY
Start: 2024-04-24

## 2024-08-05 NOTE — PROGRESS NOTES
[facility], [facility] UROGYNECOLOGY AND PELVIC REHABILITATION   [address] [place] [address]  [address] [place] [address]  Dept: [phone]  Date: 8/8/2024  Patient Name: Cally Ballesteros    VISIT - NEW PATIENT VISIT     CC: Pt referred by Dr. Orellana for urinary incontinence    HPI: Pt reports UI since birth of son 13 years ago, getting worse. Pt notes leakage with coughing, sneezing, jumping, running all cause leakage. Does not wear pads, has to change underpants and pants most days. Sometimes with urgency goes and only voids small amount, does not feel she needs to rush. Drinks a lot of water, more than a gallon.  No vaginal bleeding post endometrial ablation 2014. Mutually monogamous relationship. No MBC; has discussed this with gyn. Pap smear UTD.  Pain with intercourse, relates to size of partner; lubrication adequate.  AUASS total 9, QOL 6 \"terrible\"  Pt has trialled kegels at home without any improvement.    Topics of Prolapse, Pain, Pressure were reviewed including type, period of onset, level of severity, quality and quantity, associations, trends, exacerbators, alleviators, bleeding, prolapse to reduce, splinting, and prior treatment / surgery.    Topics of Urinary Leakage were reviewed including type, period of onset, level of severity, quality and quantity, associations, trends, exacerbators, alleviators, urgency, frequency, nocturia ,urge incontinence / stress incontinence triggers, hesitancy, obstruction with need to catheterize, frequent UTI\"s >3 in a year diagnosed by culture, hematuria (gross or microscopic), urinary stones, and prior treatment / surgery.    Topics of Fecal Incontinence were reviewed including type, period of onset, level of severity, quality and quantity, associations, trends, exacerbators, alleviators, times per day/week, stool quality / quantity, rectal bleeding, hemorrhoids, constipation / Anismus / Proctalgia fugax, laxative

## 2024-08-08 ENCOUNTER — OFFICE VISIT (OUTPATIENT)
Age: 44
End: 2024-08-08

## 2024-08-08 VITALS
TEMPERATURE: 98.4 F | SYSTOLIC BLOOD PRESSURE: 101 MMHG | DIASTOLIC BLOOD PRESSURE: 80 MMHG | HEIGHT: 66 IN | WEIGHT: 189 LBS | HEART RATE: 90 BPM | BODY MASS INDEX: 30.37 KG/M2 | OXYGEN SATURATION: 90 %

## 2024-08-08 DIAGNOSIS — N39.46 MIXED STRESS AND URGE URINARY INCONTINENCE: Primary | ICD-10-CM

## 2024-08-08 DIAGNOSIS — K59.04 CHRONIC IDIOPATHIC CONSTIPATION: ICD-10-CM

## 2024-08-08 DIAGNOSIS — N94.10 DYSPAREUNIA IN FEMALE: ICD-10-CM

## 2024-08-08 DIAGNOSIS — R33.9 RETENTION OF URINE: ICD-10-CM

## 2024-08-08 LAB
BILIRUBIN, POC: 1
BLOOD URINE, POC: ABNORMAL
CLARITY, POC: ABNORMAL
COLOR, POC: ABNORMAL
GLUCOSE URINE, POC: ABNORMAL
KETONES, POC: ABNORMAL
LEUKOCYTE EST, POC: 25
NITRITE, POC: ABNORMAL
PH, POC: 6
PROTEIN, POC: ABNORMAL
SPECIFIC GRAVITY, POC: 1.01
UROBILINOGEN, POC: ABNORMAL

## 2024-09-26 ENCOUNTER — PROCEDURE VISIT (OUTPATIENT)
Age: 44
End: 2024-09-26

## 2024-09-26 VITALS — SYSTOLIC BLOOD PRESSURE: 99 MMHG | DIASTOLIC BLOOD PRESSURE: 71 MMHG | OXYGEN SATURATION: 95 % | HEART RATE: 85 BPM

## 2024-09-26 DIAGNOSIS — N39.46 MIXED STRESS AND URGE URINARY INCONTINENCE: Primary | ICD-10-CM

## 2024-09-26 DIAGNOSIS — Z41.9 ELECTIVE SURGERY: ICD-10-CM

## 2024-09-26 DIAGNOSIS — N94.10 DYSPAREUNIA IN FEMALE: ICD-10-CM

## 2024-09-26 DIAGNOSIS — R33.9 RETENTION OF URINE: ICD-10-CM

## 2024-09-26 DIAGNOSIS — K59.04 CHRONIC IDIOPATHIC CONSTIPATION: ICD-10-CM

## 2024-09-26 LAB
BILIRUBIN, POC: NORMAL
BLOOD URINE, POC: NORMAL
CLARITY, POC: NORMAL
COLOR, POC: NORMAL
GLUCOSE URINE, POC: NORMAL MG/DL
KETONES, POC: NORMAL MG/DL
LEUKOCYTE EST, POC: NORMAL
NITRITE, POC: NORMAL
PH, POC: 5
PROTEIN, POC: NORMAL MG/DL
SPECIFIC GRAVITY, POC: 1.02
UROBILINOGEN, POC: NORMAL MG/DL

## 2024-10-15 ENCOUNTER — HOSPITAL ENCOUNTER (OUTPATIENT)
Age: 44
Discharge: HOME | End: 2024-10-15
Payer: MEDICAID

## 2024-10-15 LAB
ERYTHROCYTE [DISTWIDTH] IN BLOOD BY AUTOMATED COUNT: 13.2 % (ref 11.8–14.4)
HCT VFR BLD AUTO: 44.4 % (ref 36.3–47.1)
HGB BLD-MCNC: 14.5 G/DL (ref 11.9–15.1)
MCH RBC QN AUTO: 29.4 PG (ref 25.2–33.5)
MCHC RBC AUTO-ENTMCNC: 32.7 G/DL (ref 28.4–34.8)
MCV RBC AUTO: 90.1 FL (ref 82.6–102.9)
NRBC BLD-RTO: 0 PER 100 WBC
PLATELET # BLD AUTO: 273 K/UL (ref 138–453)
PMV BLD AUTO: 10 FL (ref 8.1–13.5)
RBC # BLD AUTO: 4.93 M/UL (ref 3.95–5.11)
WBC OTHER # BLD: 7.6 K/UL (ref 3.5–11.3)

## 2024-10-15 PROCEDURE — 85027 COMPLETE CBC AUTOMATED: CPT

## 2024-10-15 PROCEDURE — 36415 COLL VENOUS BLD VENIPUNCTURE: CPT

## 2024-10-18 ENCOUNTER — ANESTHESIA EVENT (OUTPATIENT)
Dept: OPERATING ROOM | Age: 44
End: 2024-10-18
Payer: MEDICAID

## 2024-10-21 ENCOUNTER — HOSPITAL ENCOUNTER (OUTPATIENT)
Age: 44
Discharge: HOME | End: 2024-10-22
Attending: OBSTETRICS & GYNECOLOGY | Admitting: OBSTETRICS & GYNECOLOGY
Payer: MEDICAID

## 2024-10-21 ENCOUNTER — ANESTHESIA (OUTPATIENT)
Dept: OPERATING ROOM | Age: 44
End: 2024-10-21
Payer: MEDICAID

## 2024-10-21 DIAGNOSIS — G89.18 POST-OP PAIN: Primary | ICD-10-CM

## 2024-10-21 PROBLEM — Z98.890 POST-OPERATIVE STATE: Status: ACTIVE | Noted: 2024-10-21

## 2024-10-21 LAB
ANION GAP SERPL CALCULATED.3IONS-SCNC: 9 MMOL/L (ref 9–17)
BUN SERPL-MCNC: 13 MG/DL (ref 6–20)
CALCIUM SERPL-MCNC: 9.1 MG/DL (ref 8.6–10.4)
CHLORIDE SERPL-SCNC: 101 MMOL/L (ref 98–107)
CO2 SERPL-SCNC: 23 MMOL/L (ref 20–31)
CREAT SERPL-MCNC: 0.6 MG/DL (ref 0.5–0.9)
GFR, ESTIMATED: >90 ML/MIN/1.73M2
GLUCOSE SERPL-MCNC: 135 MG/DL (ref 70–99)
HCT VFR BLD AUTO: 39.7 % (ref 36–46)
HGB BLD-MCNC: 13.5 G/DL (ref 12–16)
POTASSIUM SERPL-SCNC: 4 MMOL/L (ref 3.7–5.3)
SODIUM SERPL-SCNC: 133 MMOL/L (ref 135–144)

## 2024-10-21 PROCEDURE — 51725 SIMPLE CYSTOMETROGRAM: CPT | Performed by: OBSTETRICS & GYNECOLOGY

## 2024-10-21 PROCEDURE — 2580000003 HC RX 258: Performed by: STUDENT IN AN ORGANIZED HEALTH CARE EDUCATION/TRAINING PROGRAM

## 2024-10-21 PROCEDURE — 6360000002 HC RX W HCPCS: Performed by: OBSTETRICS & GYNECOLOGY

## 2024-10-21 PROCEDURE — 6370000000 HC RX 637 (ALT 250 FOR IP): Performed by: ANESTHESIOLOGY

## 2024-10-21 PROCEDURE — 85018 HEMOGLOBIN: CPT

## 2024-10-21 PROCEDURE — 57288 REPAIR BLADDER DEFECT: CPT | Performed by: OBSTETRICS & GYNECOLOGY

## 2024-10-21 PROCEDURE — 2500000003 HC RX 250 WO HCPCS: Performed by: SPECIALIST

## 2024-10-21 PROCEDURE — 6360000002 HC RX W HCPCS: Performed by: ANESTHESIOLOGY

## 2024-10-21 PROCEDURE — 2580000003 HC RX 258

## 2024-10-21 PROCEDURE — 3600000013 HC SURGERY LEVEL 3 ADDTL 15MIN: Performed by: OBSTETRICS & GYNECOLOGY

## 2024-10-21 PROCEDURE — 3600000003 HC SURGERY LEVEL 3 BASE: Performed by: OBSTETRICS & GYNECOLOGY

## 2024-10-21 PROCEDURE — 6370000000 HC RX 637 (ALT 250 FOR IP)

## 2024-10-21 PROCEDURE — 80048 BASIC METABOLIC PNL TOTAL CA: CPT

## 2024-10-21 PROCEDURE — 3700000000 HC ANESTHESIA ATTENDED CARE: Performed by: OBSTETRICS & GYNECOLOGY

## 2024-10-21 PROCEDURE — 85014 HEMATOCRIT: CPT

## 2024-10-21 PROCEDURE — 2709999900 HC NON-CHARGEABLE SUPPLY: Performed by: OBSTETRICS & GYNECOLOGY

## 2024-10-21 PROCEDURE — 6360000002 HC RX W HCPCS

## 2024-10-21 PROCEDURE — 7100000000 HC PACU RECOVERY - FIRST 15 MIN: Performed by: OBSTETRICS & GYNECOLOGY

## 2024-10-21 PROCEDURE — C1771 REP DEV, URINARY, W/SLING: HCPCS | Performed by: OBSTETRICS & GYNECOLOGY

## 2024-10-21 PROCEDURE — 6360000002 HC RX W HCPCS: Performed by: SPECIALIST

## 2024-10-21 PROCEDURE — 2500000003 HC RX 250 WO HCPCS: Performed by: OBSTETRICS & GYNECOLOGY

## 2024-10-21 PROCEDURE — 7100000001 HC PACU RECOVERY - ADDTL 15 MIN: Performed by: OBSTETRICS & GYNECOLOGY

## 2024-10-21 PROCEDURE — 3700000001 HC ADD 15 MINUTES (ANESTHESIA): Performed by: OBSTETRICS & GYNECOLOGY

## 2024-10-21 DEVICE — SUPRAPUBIC MID-URETHRAL SLING
Type: IMPLANTABLE DEVICE | Site: PELVIS | Status: FUNCTIONAL
Brand: LYNX ULTRA SYSTEM

## 2024-10-21 RX ORDER — KETOROLAC TROMETHAMINE 15 MG/ML
15 INJECTION, SOLUTION INTRAMUSCULAR; INTRAVENOUS EVERY 6 HOURS
Status: COMPLETED | OUTPATIENT
Start: 2024-10-21 | End: 2024-10-22

## 2024-10-21 RX ORDER — DEXAMETHASONE SODIUM PHOSPHATE 10 MG/ML
INJECTION, SOLUTION INTRAMUSCULAR; INTRAVENOUS
Status: DISCONTINUED | OUTPATIENT
Start: 2024-10-21 | End: 2024-10-21 | Stop reason: SDUPTHER

## 2024-10-21 RX ORDER — ONDANSETRON 2 MG/ML
4 INJECTION INTRAMUSCULAR; INTRAVENOUS
Status: COMPLETED | OUTPATIENT
Start: 2024-10-21 | End: 2024-10-21

## 2024-10-21 RX ORDER — SODIUM CHLORIDE 0.9 % (FLUSH) 0.9 %
5-40 SYRINGE (ML) INJECTION PRN
Status: DISCONTINUED | OUTPATIENT
Start: 2024-10-21 | End: 2024-10-21 | Stop reason: HOSPADM

## 2024-10-21 RX ORDER — OXYCODONE AND ACETAMINOPHEN 5; 325 MG/1; MG/1
1 TABLET ORAL EVERY 6 HOURS
Qty: 16 TABLET | Refills: 0 | Status: SHIPPED | OUTPATIENT
Start: 2024-10-21 | End: 2024-10-25

## 2024-10-21 RX ORDER — SODIUM CHLORIDE 0.9 % (FLUSH) 0.9 %
5-40 SYRINGE (ML) INJECTION PRN
Status: DISCONTINUED | OUTPATIENT
Start: 2024-10-21 | End: 2024-10-22 | Stop reason: HOSPADM

## 2024-10-21 RX ORDER — ENOXAPARIN SODIUM 100 MG/ML
40 INJECTION SUBCUTANEOUS EVERY 24 HOURS
Status: DISCONTINUED | OUTPATIENT
Start: 2024-10-22 | End: 2024-10-22 | Stop reason: HOSPADM

## 2024-10-21 RX ORDER — DIPHENHYDRAMINE HYDROCHLORIDE 50 MG/ML
25 INJECTION INTRAMUSCULAR; INTRAVENOUS ONCE
Status: DISCONTINUED | OUTPATIENT
Start: 2024-10-21 | End: 2024-10-21

## 2024-10-21 RX ORDER — DIPHENHYDRAMINE HYDROCHLORIDE 50 MG/ML
12.5 INJECTION INTRAMUSCULAR; INTRAVENOUS
Status: DISCONTINUED | OUTPATIENT
Start: 2024-10-21 | End: 2024-10-21 | Stop reason: HOSPADM

## 2024-10-21 RX ORDER — MEPERIDINE HYDROCHLORIDE 50 MG/ML
12.5 INJECTION INTRAMUSCULAR; INTRAVENOUS; SUBCUTANEOUS EVERY 5 MIN PRN
Status: DISCONTINUED | OUTPATIENT
Start: 2024-10-21 | End: 2024-10-21 | Stop reason: HOSPADM

## 2024-10-21 RX ORDER — ONDANSETRON 2 MG/ML
4 INJECTION INTRAMUSCULAR; INTRAVENOUS EVERY 6 HOURS PRN
Status: DISCONTINUED | OUTPATIENT
Start: 2024-10-21 | End: 2024-10-22 | Stop reason: HOSPADM

## 2024-10-21 RX ORDER — SODIUM CHLORIDE 0.9 % (FLUSH) 0.9 %
5-40 SYRINGE (ML) INJECTION EVERY 12 HOURS SCHEDULED
Status: DISCONTINUED | OUTPATIENT
Start: 2024-10-21 | End: 2024-10-22 | Stop reason: HOSPADM

## 2024-10-21 RX ORDER — LIDOCAINE HYDROCHLORIDE AND EPINEPHRINE 10; 10 MG/ML; UG/ML
INJECTION, SOLUTION INFILTRATION; PERINEURAL
Status: DISCONTINUED
Start: 2024-10-21 | End: 2024-10-21

## 2024-10-21 RX ORDER — SODIUM CHLORIDE 0.9 % (FLUSH) 0.9 %
5-40 SYRINGE (ML) INJECTION EVERY 12 HOURS SCHEDULED
Status: DISCONTINUED | OUTPATIENT
Start: 2024-10-21 | End: 2024-10-21 | Stop reason: HOSPADM

## 2024-10-21 RX ORDER — ONDANSETRON 2 MG/ML
INJECTION INTRAMUSCULAR; INTRAVENOUS
Status: DISCONTINUED | OUTPATIENT
Start: 2024-10-21 | End: 2024-10-21 | Stop reason: SDUPTHER

## 2024-10-21 RX ORDER — LIDOCAINE HYDROCHLORIDE 10 MG/ML
1 INJECTION, SOLUTION EPIDURAL; INFILTRATION; INTRACAUDAL; PERINEURAL
Status: DISCONTINUED | OUTPATIENT
Start: 2024-10-21 | End: 2024-10-21 | Stop reason: HOSPADM

## 2024-10-21 RX ORDER — METOCLOPRAMIDE HYDROCHLORIDE 5 MG/ML
10 INJECTION INTRAMUSCULAR; INTRAVENOUS ONCE
Status: DISCONTINUED | OUTPATIENT
Start: 2024-10-21 | End: 2024-10-21

## 2024-10-21 RX ORDER — PROCHLORPERAZINE MALEATE 10 MG
10 TABLET ORAL EVERY 6 HOURS PRN
Status: DISCONTINUED | OUTPATIENT
Start: 2024-10-21 | End: 2024-10-22 | Stop reason: HOSPADM

## 2024-10-21 RX ORDER — LANOLIN ALCOHOL/MO/W.PET/CERES
400 CREAM (GRAM) TOPICAL ONCE
Status: COMPLETED | OUTPATIENT
Start: 2024-10-21 | End: 2024-10-21

## 2024-10-21 RX ORDER — BUTALBITAL, ACETAMINOPHEN AND CAFFEINE 50; 325; 40 MG/1; MG/1; MG/1
1 TABLET ORAL EVERY 6 HOURS PRN
Status: DISCONTINUED | OUTPATIENT
Start: 2024-10-21 | End: 2024-10-22 | Stop reason: HOSPADM

## 2024-10-21 RX ORDER — GLYCOPYRROLATE 0.2 MG/ML
0.4 INJECTION INTRAMUSCULAR; INTRAVENOUS ONCE
Status: DISCONTINUED | OUTPATIENT
Start: 2024-10-21 | End: 2024-10-21 | Stop reason: HOSPADM

## 2024-10-21 RX ORDER — MIDAZOLAM HYDROCHLORIDE 2 MG/2ML
2 INJECTION, SOLUTION INTRAMUSCULAR; INTRAVENOUS
Status: DISCONTINUED | OUTPATIENT
Start: 2024-10-21 | End: 2024-10-21 | Stop reason: HOSPADM

## 2024-10-21 RX ORDER — PREGABALIN 100 MG/1
100 CAPSULE ORAL 2 TIMES DAILY
Status: DISCONTINUED | OUTPATIENT
Start: 2024-10-21 | End: 2024-10-22 | Stop reason: HOSPADM

## 2024-10-21 RX ORDER — PROCHLORPERAZINE EDISYLATE 5 MG/ML
10 INJECTION INTRAMUSCULAR; INTRAVENOUS EVERY 6 HOURS PRN
Status: DISCONTINUED | OUTPATIENT
Start: 2024-10-21 | End: 2024-10-22 | Stop reason: HOSPADM

## 2024-10-21 RX ORDER — PROPOFOL 10 MG/ML
INJECTION, EMULSION INTRAVENOUS
Status: DISCONTINUED | OUTPATIENT
Start: 2024-10-21 | End: 2024-10-21 | Stop reason: SDUPTHER

## 2024-10-21 RX ORDER — LABETALOL HYDROCHLORIDE 5 MG/ML
10 INJECTION, SOLUTION INTRAVENOUS
Status: DISCONTINUED | OUTPATIENT
Start: 2024-10-21 | End: 2024-10-21 | Stop reason: HOSPADM

## 2024-10-21 RX ORDER — AMOXICILLIN 250 MG
2 CAPSULE ORAL
Qty: 60 TABLET | Refills: 1 | Status: SHIPPED | OUTPATIENT
Start: 2024-10-21 | End: 2024-11-20

## 2024-10-21 RX ORDER — MORPHINE SULFATE 2 MG/ML
2 INJECTION, SOLUTION INTRAMUSCULAR; INTRAVENOUS EVERY 5 MIN PRN
Status: DISCONTINUED | OUTPATIENT
Start: 2024-10-21 | End: 2024-10-21 | Stop reason: HOSPADM

## 2024-10-21 RX ORDER — LANOLIN ALCOHOL/MO/W.PET/CERES
3 CREAM (GRAM) TOPICAL NIGHTLY PRN
Status: DISCONTINUED | OUTPATIENT
Start: 2024-10-21 | End: 2024-10-22 | Stop reason: HOSPADM

## 2024-10-21 RX ORDER — FENTANYL CITRATE 50 UG/ML
INJECTION, SOLUTION INTRAMUSCULAR; INTRAVENOUS
Status: DISCONTINUED | OUTPATIENT
Start: 2024-10-21 | End: 2024-10-21 | Stop reason: SDUPTHER

## 2024-10-21 RX ORDER — OXYCODONE AND ACETAMINOPHEN 5; 325 MG/1; MG/1
1 TABLET ORAL
Status: COMPLETED | OUTPATIENT
Start: 2024-10-21 | End: 2024-10-21

## 2024-10-21 RX ORDER — SODIUM CHLORIDE 9 MG/ML
INJECTION, SOLUTION INTRAVENOUS PRN
Status: DISCONTINUED | OUTPATIENT
Start: 2024-10-21 | End: 2024-10-22 | Stop reason: HOSPADM

## 2024-10-21 RX ORDER — LIDOCAINE HYDROCHLORIDE 10 MG/ML
INJECTION, SOLUTION EPIDURAL; INFILTRATION; INTRACAUDAL; PERINEURAL
Status: DISCONTINUED | OUTPATIENT
Start: 2024-10-21 | End: 2024-10-21 | Stop reason: SDUPTHER

## 2024-10-21 RX ORDER — SIMETHICONE 80 MG
80 TABLET,CHEWABLE ORAL EVERY 6 HOURS PRN
Status: DISCONTINUED | OUTPATIENT
Start: 2024-10-21 | End: 2024-10-22 | Stop reason: HOSPADM

## 2024-10-21 RX ORDER — MIDAZOLAM HYDROCHLORIDE 1 MG/ML
INJECTION, SOLUTION INTRAMUSCULAR; INTRAVENOUS
Status: DISCONTINUED | OUTPATIENT
Start: 2024-10-21 | End: 2024-10-21 | Stop reason: SDUPTHER

## 2024-10-21 RX ORDER — IBUPROFEN 600 MG/1
600 TABLET, FILM COATED ORAL EVERY 6 HOURS PRN
Qty: 120 TABLET | Refills: 0 | Status: SHIPPED | OUTPATIENT
Start: 2024-10-21 | End: 2024-11-20

## 2024-10-21 RX ORDER — HYDRALAZINE HYDROCHLORIDE 20 MG/ML
10 INJECTION INTRAMUSCULAR; INTRAVENOUS
Status: DISCONTINUED | OUTPATIENT
Start: 2024-10-21 | End: 2024-10-21 | Stop reason: HOSPADM

## 2024-10-21 RX ORDER — OXYCODONE HYDROCHLORIDE 5 MG/1
5 TABLET ORAL EVERY 4 HOURS PRN
Status: DISCONTINUED | OUTPATIENT
Start: 2024-10-21 | End: 2024-10-22 | Stop reason: HOSPADM

## 2024-10-21 RX ORDER — SODIUM CHLORIDE 9 MG/ML
INJECTION, SOLUTION INTRAVENOUS PRN
Status: DISCONTINUED | OUTPATIENT
Start: 2024-10-21 | End: 2024-10-21 | Stop reason: HOSPADM

## 2024-10-21 RX ORDER — SENNA AND DOCUSATE SODIUM 50; 8.6 MG/1; MG/1
2 TABLET, FILM COATED ORAL NIGHTLY PRN
Status: DISCONTINUED | OUTPATIENT
Start: 2024-10-21 | End: 2024-10-22 | Stop reason: HOSPADM

## 2024-10-21 RX ORDER — METOCLOPRAMIDE HYDROCHLORIDE 5 MG/ML
10 INJECTION INTRAMUSCULAR; INTRAVENOUS
Status: DISCONTINUED | OUTPATIENT
Start: 2024-10-21 | End: 2024-10-21 | Stop reason: HOSPADM

## 2024-10-21 RX ORDER — ACETAMINOPHEN 500 MG
1000 TABLET ORAL EVERY 6 HOURS PRN
Status: DISCONTINUED | OUTPATIENT
Start: 2024-10-21 | End: 2024-10-22 | Stop reason: HOSPADM

## 2024-10-21 RX ORDER — SODIUM CHLORIDE, SODIUM LACTATE, POTASSIUM CHLORIDE, CALCIUM CHLORIDE 600; 310; 30; 20 MG/100ML; MG/100ML; MG/100ML; MG/100ML
INJECTION, SOLUTION INTRAVENOUS CONTINUOUS
Status: DISCONTINUED | OUTPATIENT
Start: 2024-10-21 | End: 2024-10-21 | Stop reason: HOSPADM

## 2024-10-21 RX ORDER — ONDANSETRON 4 MG/1
4 TABLET, ORALLY DISINTEGRATING ORAL EVERY 8 HOURS PRN
Status: DISCONTINUED | OUTPATIENT
Start: 2024-10-21 | End: 2024-10-22 | Stop reason: HOSPADM

## 2024-10-21 RX ORDER — CEPHALEXIN 500 MG/1
500 CAPSULE ORAL 2 TIMES DAILY
Qty: 10 CAPSULE | Refills: 0 | Status: SHIPPED | OUTPATIENT
Start: 2024-10-21 | End: 2024-10-25

## 2024-10-21 RX ORDER — IBUPROFEN 800 MG/1
800 TABLET, FILM COATED ORAL EVERY 8 HOURS PRN
Status: DISCONTINUED | OUTPATIENT
Start: 2024-10-22 | End: 2024-10-22 | Stop reason: HOSPADM

## 2024-10-21 RX ORDER — LIDOCAINE HYDROCHLORIDE 10 MG/ML
INJECTION, SOLUTION INFILTRATION; PERINEURAL
Status: DISCONTINUED
Start: 2024-10-21 | End: 2024-10-21

## 2024-10-21 RX ORDER — SCOLOPAMINE TRANSDERMAL SYSTEM 1 MG/1
1 PATCH, EXTENDED RELEASE TRANSDERMAL ONCE
Status: DISCONTINUED | OUTPATIENT
Start: 2024-10-21 | End: 2024-10-21

## 2024-10-21 RX ORDER — ONDANSETRON 4 MG/1
4 TABLET, FILM COATED ORAL 4 TIMES DAILY PRN
Qty: 28 TABLET | Refills: 0 | Status: SHIPPED | OUTPATIENT
Start: 2024-10-21 | End: 2024-10-28

## 2024-10-21 RX ORDER — METOCLOPRAMIDE HYDROCHLORIDE 5 MG/ML
10 INJECTION INTRAMUSCULAR; INTRAVENOUS EVERY 6 HOURS PRN
Status: DISCONTINUED | OUTPATIENT
Start: 2024-10-21 | End: 2024-10-22 | Stop reason: HOSPADM

## 2024-10-21 RX ORDER — DIPHENHYDRAMINE HYDROCHLORIDE 50 MG/ML
25 INJECTION INTRAMUSCULAR; INTRAVENOUS EVERY 6 HOURS PRN
Status: DISCONTINUED | OUTPATIENT
Start: 2024-10-21 | End: 2024-10-22 | Stop reason: HOSPADM

## 2024-10-21 RX ORDER — LIDOCAINE HYDROCHLORIDE 10 MG/ML
INJECTION, SOLUTION EPIDURAL; INFILTRATION; INTRACAUDAL; PERINEURAL PRN
Status: DISCONTINUED | OUTPATIENT
Start: 2024-10-21 | End: 2024-10-21 | Stop reason: ALTCHOICE

## 2024-10-21 RX ORDER — KETOROLAC TROMETHAMINE 30 MG/ML
INJECTION, SOLUTION INTRAMUSCULAR; INTRAVENOUS
Status: DISCONTINUED | OUTPATIENT
Start: 2024-10-21 | End: 2024-10-21 | Stop reason: SDUPTHER

## 2024-10-21 RX ORDER — MECLIZINE HCL 12.5 MG 12.5 MG/1
12.5 TABLET ORAL ONCE
Status: COMPLETED | OUTPATIENT
Start: 2024-10-21 | End: 2024-10-21

## 2024-10-21 RX ORDER — OXYCODONE AND ACETAMINOPHEN 5; 325 MG/1; MG/1
2 TABLET ORAL
Status: DISCONTINUED | OUTPATIENT
Start: 2024-10-21 | End: 2024-10-21 | Stop reason: HOSPADM

## 2024-10-21 RX ORDER — LAMOTRIGINE 25 MG/1
25 TABLET ORAL NIGHTLY PRN
Status: DISCONTINUED | OUTPATIENT
Start: 2024-10-21 | End: 2024-10-22 | Stop reason: HOSPADM

## 2024-10-21 RX ORDER — NALOXONE HYDROCHLORIDE 0.4 MG/ML
INJECTION, SOLUTION INTRAMUSCULAR; INTRAVENOUS; SUBCUTANEOUS PRN
Status: DISCONTINUED | OUTPATIENT
Start: 2024-10-21 | End: 2024-10-21 | Stop reason: HOSPADM

## 2024-10-21 RX ORDER — PHENAZOPYRIDINE HYDROCHLORIDE 100 MG/1
100 TABLET, FILM COATED ORAL ONCE
Status: COMPLETED | OUTPATIENT
Start: 2024-10-21 | End: 2024-10-21

## 2024-10-21 RX ORDER — MORPHINE SULFATE 2 MG/ML
2 INJECTION, SOLUTION INTRAMUSCULAR; INTRAVENOUS
Status: DISCONTINUED | OUTPATIENT
Start: 2024-10-21 | End: 2024-10-22 | Stop reason: HOSPADM

## 2024-10-21 RX ORDER — IPRATROPIUM BROMIDE AND ALBUTEROL SULFATE 2.5; .5 MG/3ML; MG/3ML
1 SOLUTION RESPIRATORY (INHALATION)
Status: DISCONTINUED | OUTPATIENT
Start: 2024-10-21 | End: 2024-10-21 | Stop reason: HOSPADM

## 2024-10-21 RX ADMIN — METOCLOPRAMIDE 10 MG: 5 INJECTION, SOLUTION INTRAMUSCULAR; INTRAVENOUS at 20:21

## 2024-10-21 RX ADMIN — KETOROLAC TROMETHAMINE 15 MG: 15 INJECTION, SOLUTION INTRAMUSCULAR; INTRAVENOUS at 20:20

## 2024-10-21 RX ADMIN — Medication 2000 MG: at 10:28

## 2024-10-21 RX ADMIN — PHENAZOPYRIDINE HYDROCHLORIDE 100 MG: 100 TABLET ORAL at 08:42

## 2024-10-21 RX ADMIN — Medication 2000 MG: at 20:24

## 2024-10-21 RX ADMIN — MIDAZOLAM 2 MG: 1 INJECTION INTRAMUSCULAR; INTRAVENOUS at 10:21

## 2024-10-21 RX ADMIN — Medication 400 MG: at 20:19

## 2024-10-21 RX ADMIN — BUTALBITAL, ACETAMINOPHEN, AND CAFFEINE 1 TABLET: 325; 50; 40 TABLET ORAL at 20:19

## 2024-10-21 RX ADMIN — MECLIZINE 12.5 MG: 12.5 TABLET ORAL at 14:09

## 2024-10-21 RX ADMIN — SODIUM CHLORIDE, PRESERVATIVE FREE 10 ML: 5 INJECTION INTRAVENOUS at 20:22

## 2024-10-21 RX ADMIN — FENTANYL CITRATE 50 MCG: 50 INJECTION, SOLUTION INTRAMUSCULAR; INTRAVENOUS at 10:24

## 2024-10-21 RX ADMIN — LAMOTRIGINE 25 MG: 25 TABLET ORAL at 20:19

## 2024-10-21 RX ADMIN — OXYCODONE HYDROCHLORIDE AND ACETAMINOPHEN 1 TABLET: 5; 325 TABLET ORAL at 11:25

## 2024-10-21 RX ADMIN — DIPHENHYDRAMINE HYDROCHLORIDE 25 MG: 50 INJECTION INTRAMUSCULAR; INTRAVENOUS at 20:21

## 2024-10-21 RX ADMIN — ONDANSETRON 4 MG: 2 INJECTION INTRAMUSCULAR; INTRAVENOUS at 10:51

## 2024-10-21 RX ADMIN — ONDANSETRON 4 MG: 2 INJECTION INTRAMUSCULAR; INTRAVENOUS at 12:48

## 2024-10-21 RX ADMIN — SODIUM CHLORIDE, PRESERVATIVE FREE 10 ML: 5 INJECTION INTRAVENOUS at 10:24

## 2024-10-21 RX ADMIN — LIDOCAINE HYDROCHLORIDE 50 MG: 10 INJECTION, SOLUTION EPIDURAL; INFILTRATION; INTRACAUDAL; PERINEURAL at 10:24

## 2024-10-21 RX ADMIN — KETOROLAC TROMETHAMINE 30 MG: 30 INJECTION, SOLUTION INTRAMUSCULAR at 10:52

## 2024-10-21 RX ADMIN — PROPOFOL 200 MG: 10 INJECTION, EMULSION INTRAVENOUS at 10:24

## 2024-10-21 RX ADMIN — FENTANYL CITRATE 50 MCG: 50 INJECTION, SOLUTION INTRAMUSCULAR; INTRAVENOUS at 10:41

## 2024-10-21 RX ADMIN — DEXAMETHASONE SODIUM PHOSPHATE 10 MG: 10 INJECTION, SOLUTION INTRAMUSCULAR; INTRAVENOUS at 10:28

## 2024-10-21 RX ADMIN — PREGABALIN 100 MG: 100 CAPSULE ORAL at 20:19

## 2024-10-21 RX ADMIN — SODIUM CHLORIDE, PRESERVATIVE FREE 10 ML: 5 INJECTION INTRAVENOUS at 08:50

## 2024-10-21 ASSESSMENT — PAIN SCALES - GENERAL
PAINLEVEL_OUTOF10: 7
PAINLEVEL_OUTOF10: 7
PAINLEVEL_OUTOF10: 5
PAINLEVEL_OUTOF10: 5

## 2024-10-21 ASSESSMENT — PAIN - FUNCTIONAL ASSESSMENT
PAIN_FUNCTIONAL_ASSESSMENT: ACTIVITIES ARE NOT PREVENTED
PAIN_FUNCTIONAL_ASSESSMENT: 0-10

## 2024-10-21 ASSESSMENT — PAIN DESCRIPTION - PAIN TYPE: TYPE: SURGICAL PAIN

## 2024-10-21 ASSESSMENT — PAIN DESCRIPTION - LOCATION
LOCATION: PELVIS
LOCATION: HEAD;PELVIS

## 2024-10-21 ASSESSMENT — PAIN DESCRIPTION - ORIENTATION: ORIENTATION: RIGHT;MID

## 2024-10-21 ASSESSMENT — PAIN DESCRIPTION - FREQUENCY: FREQUENCY: CONTINUOUS

## 2024-10-21 ASSESSMENT — PAIN DESCRIPTION - DESCRIPTORS: DESCRIPTORS: DISCOMFORT;TENDER;SORE

## 2024-10-21 ASSESSMENT — PAIN DESCRIPTION - ONSET: ONSET: ON-GOING

## 2024-10-21 NOTE — ANESTHESIA POSTPROCEDURE EVALUATION
Department of Anesthesiology  Postprocedure Note    Patient: Cally Ballesteros  MRN: [id number]  YOB: 1980  Date of evaluation: 10/21/2024    Procedure Summary       Date: 10/21/24 Room / Location: [address]    Anesthesia Start: 1021 Anesthesia Stop: 1106    Procedure: CYSTOSCOPY URETHRAL LYNX SLING INSERTION Diagnosis:       Urinary incontinence, mixed      (Urinary incontinence, mixed [N39.46])    Surgeons: Steffen Mayo DO Responsible Provider: Remi Torres MD    Anesthesia Type: general ASA Status: 2            Anesthesia Type: No value filed.    Berry Phase I: Berry Score: 8    Berry Phase II:      Anesthesia Post Evaluation    Patient location during evaluation: PACU  Patient participation: complete - patient participated  Level of consciousness: awake and alert  Airway patency: patent  Nausea & Vomiting: no nausea and no vomiting  Cardiovascular status: hemodynamically stable  Respiratory status: room air and spontaneous ventilation  Hydration status: euvolemic  Multimodal analgesia pain management approach  Pain management: adequate    No notable events documented.

## 2024-10-21 NOTE — DISCHARGE INSTRUCTIONS
drive after your 1week post-op visit if cleared, have discontinued narcotic pain meds, and are able to depress the brake quickly without pain. Long distance travel may be resumed in 4 weeks if stable.   With major robotic hysterectomy, you should refrain from intercourse for 8 weeks, other hysterectomies 6 weeks.   MINOR   Minor surgeries may drive the next day any distance if off narcotic pain meds and are able to brake safely.   For minor vaginal surgeries for prolapse and / or urinary incontinence procedures, maintain pelvic rest for 4 weeks. Exercise and work may be resumed within 2-4 weeks depending on healing.   For minor surgeries of the vagina, uterus, and bladder, hysteroscopy, D&C, and laparoscopy, maintain pelvic rest for 1-2 weeks depending on healing. Exercise and work may be resumed within the week.                                                                              4  CONSENT ITEMS REVISITED IN THE POST OPERATIVE PERIOD   Physical and sexual activity will be restricted to varying degrees for an indeterminate period of time, but most often 2-8 weeks depending on the breadth of surgery.  It is impossible to list every undesirable effect. The condition for which surgery is done is not always cured or significantly improved, and in rare cases may even worsen.    There is no 100% guarantee that the planned surgery, despite everything being done correctly and to the medico-surgical standard, with resolve a condition 100% including but not limited to pain, prolapse, mesh erosion, urinary infections, bladder function, or defecatory dysfunction. Specifically, up to 20% of patients with abdominopelvic pain, 27% of those with UTI's, 16-26% with urinary incontinence or voiding dysfunction, and 40% with defecatory dysfunction may not see substantial long-lasting improvement from a surgical intervention.   Dangerous blood clots in the legs or lungs may occur post-operatively. Patients on chronic blood

## 2024-10-21 NOTE — BRIEF OP NOTE
Brief Operative Note  Department of Obstetrics and Gynecology  [facility]     Patient: Cally Ballesteros   : 1980  MRN: [id number]       Acct: [id number]   Date of Procedure: 10/20/24     Pre-operative Diagnosis: 43 y.o. female    Mixed stress and urge urinary incontinence     Post-operative Diagnosis: same as above  Mixed stress and urge urinary incontinence    Procedure: Cystoscopy with Lynx sling insertion    Surgeon: Dr. Mayo     Assistant(s): Aurelia Costello MD, PGY4    Anesthesia: general via LMA    Findings:  Normal appearing external genitalia and vaginal mucosa. Normal appearing bladder without evidence of sling, lesions or masses. Bilateral efflux of urine from ureteral jets.   Total IV fluids/Blood products:  see anesthesia documentation  Urine Output:  150 ml    Estimated blood loss:  10 mL  Drains:  none  Specimens:  none  Instrument and Sponge Count: Correct  Complications:  none  Condition:  good, transferred to post anesthesia recovery    Aurelia Costello MD  Ob/Gyn Resident  10/21/2024, 11:00 AM

## 2024-10-21 NOTE — DISCHARGE SUMMARY
Urogynecology Discharge Summary  [facility]      Patient Name: Cally Ballesteros  Patient : 1980  Primary Care Physician: None, None  Admit Date: 10/21/2024    Principal Diagnosis: Mixed stress and urge urinary incontinence    Other Diagnosis:   Urinary incontinence, mixed [N39.46]  Post-operative state [Z98.890]  Patient Active Problem List   Diagnosis    Meningitis    Acute nonintractable headache    Post-operative state     Infection: No  Hospital Acquired: No    Surgical Operations & Procedures: Cystoscopy with Lynx sling insertion     Consultations: Anesthesia    Pertinent Findings & Procedures:   Cally Ballesteros is a 43 y.o. female , admitted for elective surgery for Mixed stress and urge urinary incontinence; received Ancef and scopolamine patch preop.  She underwent Cystoscopy with Lynx sling insertion on 10/21/24.  Post-operatively, patient unable to void postoperatively. She was discharged home with a reeves catheter. Post-op course normal, discharged home on POD#1.  POD#1 labs were stable. Follow up in 1 week. Discharge instructions reviewed and questions answered.    Course of patient: normal    Discharge to: Home    Readmission planned: No    Recommendations on Discharge:     Medications:     Medication List        START taking these medications      cephALEXin 500 MG capsule  Commonly known as: KEFLEX  Take 1 capsule by mouth 2 times daily for 5 days     ibuprofen 600 MG tablet  Commonly known as: ADVIL;MOTRIN  Take 1 tablet by mouth every 6 hours as needed for Pain     ondansetron 4 MG tablet  Commonly known as: ZOFRAN  Take 1 tablet by mouth 4 times daily as needed for Nausea or Vomiting     oxyCODONE-acetaminophen 5-325 MG per tablet  Commonly known as: Percocet  Take 1 tablet by mouth every 6 hours for 4 days. Intended supply: 4 days. Take lowest dose possible to manage pain Max Daily Amount: 4 tablets     senna-docusate 8.6-50 MG per tablet  Commonly known as:

## 2024-10-21 NOTE — OP NOTE
start of the procedure.    The patient was placed in the low lithotomy position with a Trendelenburg tilt to theoretically displace any small bowel up out of the lower pelvic cavity.  An 18-Beninese Ellison catheter was placed, and its balloon deployed to identify the mid urethra.  A marking pen was used to make two marks bilaterally, staying 2 cm above and lateral to the pubic symphysis midline.  These marks were injected retropubically with up to 15 mL's a piece of 1% plain anesthetic.  The mid urethra was identified by palpating the Ellison balloon transvaginally.  Allis clamps were used to retract periurethral tissues for better exposure.  The mid urethra was injected vaginally with up to 10 mL's of 1% anesthetic with epinephrine.  Scalpel incisions were made in all 3 areas.  Vaginal Metzenbaum dissection, staying at the level of the muscularis and not submucosally gained access to the retropubis bilaterally without undue increase in bleeding.  The catheter was deviated contralaterally to direct the urethra and bladder neck away from antegrade sling trocar placement bilaterally without undue increase in bleeding.  The Ellison catheter was removed for cystoscopic inspection.    An appropriate degree cystourethroscope was passed without difficulty through a physiologic mid urethral stricture and into the area of the urethrovesical junction.     The urethra, UVJ, bladder trigone, ureters, and bladder proper were systematically examined. Bladder capacity was determined. LY and UUI at capacity was determined in the supine position. Uninhibited detrusor leakage on filling or at capacity was ruled out.    Dictated entry of pertinent positives: Patulous urethrovesical junction.    Ureters were patent bilaterally and effluxed urine repetitively. There was no evidence of cystotomy or urethrotomy at 300 mL's of filling.      Leakage: No    The sling with sheath was then attached to the trocars and drawn up retropubically and out

## 2024-10-21 NOTE — H&P
UroGyn Pre-Op H&P  [facility]    Patient Name: Cally Ballesteros     Patient : 1980  Room/Bed: [facility] [place]/NONE  Admission Date/Time: 10/21/2024  8:13 AM  Primary Care Physician: None, None  MRN: [id number]    Date: 10/21/2024  Time: 9:23 AM    The patient was seen in pre-op holding. She is here for Cystoscopy with Lynx sling insertion.     The patient is being admitted for a planned elective surgical procedure today. She has had symptoms, physical findings, and diagnostic testing that have an appropriate indication for today's planned procedure. The patient has been educated about their condition, conservative and surgical options was been offered to the patient, and the patient has decided to proceed with a surgical option. An informed consent has been signed under no duress or confusion. If indicated, the patient has been surgically cleared by her PCP and /or any pertinent specialist. All labs and testing have been reviewed. If of reproductive age and without permanent sterilization, a pregnancy test was confirmed as negative. The patient has satisfactorily completed any pre-operative preparations prior to surgery. If MRSA positive, she had completed the standard surgical preparation protocol. The patient took any required medicines prior to surgery with a sip of water but otherwise had taken nothing by mouth. The patient has discontinued any blood thinners as required to proceed with surgery. The patient denies chest pain, shortness of breath, calf pain, or open sores on the day of surgery. All dentures and body jewelry have been removed and / or taped.      REVIEW OF SYSTEMS:  14 point ROS negative except for above listed in HPI.   General/Constitutional: Denies chills, fever, headaches, weight gain, weight loss   Ophthalmologic: Denies recent abrupt vision changes, blurry vision   ENT: Denies nasal discharge, congestion, sinus pain, sore throat, thyroid changes   Cardiovascular:

## 2024-10-21 NOTE — ANESTHESIA PRE PROCEDURE
Department of Anesthesiology  Preprocedure Note       Name:  Cally Ballesteros   Age:  43 y.o.  :  1980                                          MRN:  [id number]         Date:  10/21/2024      Surgeon: Surgeon(s):  Steffen Mayo DO    Procedure: Procedure(s):  CYSTOSCOPY URETHRAL SLING INSERTION    Medications prior to admission:   Prior to Admission medications    Medication Sig Start Date End Date Taking? Authorizing Provider   clonazePAM (KLONOPIN) 0.5 MG tablet Take 1 tablet by mouth 2 times daily as needed. 24  Yes Monika Larkin MD   cyclobenzaprine (FLEXERIL) 10 MG tablet Take 1 tablet by mouth 3 times daily as needed   Yes Monika Larkin MD   lamoTRIgine (LAMICTAL) 25 MG tablet Take 1 tablet by mouth nightly as needed at bedtime. 24  Yes Monika Larkin MD   tiZANidine (ZANAFLEX) 2 MG tablet Take 1 tablet by mouth nightly as needed (nightly for neck pain stifness) 7/3/24  Yes Mary Ball MD   pregabalin (LYRICA) 100 MG capsule Take 1 capsule by mouth 2 times daily.   Yes Monika Larkin MD   busPIRone (BUSPAR) 10 MG tablet Take 1 tablet by mouth 2 times daily  Patient not taking: Reported on 10/15/2024 6/26/24   Monika Larkin MD   venlafaxine (EFFEXOR) 100 MG tablet Take 1 tablet by mouth 2 times daily  Patient not taking: Reported on 10/15/2024    Monika Larkin MD       Current medications:    Current Facility-Administered Medications   Medication Dose Route Frequency Provider Last Rate Last Admin   • ceFAZolin (ANCEF) 2000 mg in sterile water 20 mL IV syringe  2,000 mg IntraVENous On Call to OR Steffen Mayo DO       • lidocaine PF 1 % injection 1 mL  1 mL IntraDERmal Once PRN Isatu Brunson MD       • lactated ringers IV soln infusion SOLN   IntraVENous Continuous Isatu Brunson MD       • sodium chloride flush 0.9 % injection 5-40 mL  5-40 mL IntraVENous 2 times per day Isatu Brunson MD       • sodium chloride flush 0.9 % injection

## 2024-10-22 VITALS
HEART RATE: 76 BPM | RESPIRATION RATE: 16 BRPM | WEIGHT: 192 LBS | HEIGHT: 66 IN | OXYGEN SATURATION: 99 % | DIASTOLIC BLOOD PRESSURE: 71 MMHG | SYSTOLIC BLOOD PRESSURE: 105 MMHG | BODY MASS INDEX: 30.86 KG/M2 | TEMPERATURE: 98.2 F

## 2024-10-22 LAB
ANION GAP SERPL CALCULATED.3IONS-SCNC: 9 MMOL/L (ref 9–17)
BASOPHILS # BLD: 0 K/UL (ref 0–0.2)
BASOPHILS NFR BLD: 0 % (ref 0–2)
BUN SERPL-MCNC: 13 MG/DL (ref 6–20)
CALCIUM SERPL-MCNC: 8.6 MG/DL (ref 8.6–10.4)
CHLORIDE SERPL-SCNC: 105 MMOL/L (ref 98–107)
CO2 SERPL-SCNC: 24 MMOL/L (ref 20–31)
CREAT SERPL-MCNC: 0.7 MG/DL (ref 0.5–0.9)
EOSINOPHIL # BLD: 0 K/UL (ref 0–0.4)
EOSINOPHILS RELATIVE PERCENT: 0 % (ref 1–4)
ERYTHROCYTE [DISTWIDTH] IN BLOOD BY AUTOMATED COUNT: 13.2 % (ref 12.5–15.4)
GFR, ESTIMATED: >90 ML/MIN/1.73M2
GLUCOSE SERPL-MCNC: 138 MG/DL (ref 70–99)
HCT VFR BLD AUTO: 36.6 % (ref 36–46)
HGB BLD-MCNC: 12.4 G/DL (ref 12–16)
LYMPHOCYTES NFR BLD: 1.6 K/UL (ref 1–4.8)
LYMPHOCYTES RELATIVE PERCENT: 10 % (ref 24–44)
MCH RBC QN AUTO: 30.2 PG (ref 26–34)
MCHC RBC AUTO-ENTMCNC: 34 G/DL (ref 31–37)
MCV RBC AUTO: 88.8 FL (ref 80–100)
MONOCYTES NFR BLD: 1.1 K/UL (ref 0.1–1.2)
MONOCYTES NFR BLD: 7 % (ref 2–11)
NEUTROPHILS NFR BLD: 83 % (ref 36–66)
NEUTS SEG NFR BLD: 12.8 K/UL (ref 1.8–7.7)
PLATELET # BLD AUTO: 259 K/UL (ref 140–450)
PMV BLD AUTO: 8 FL (ref 6–12)
POTASSIUM SERPL-SCNC: 3.8 MMOL/L (ref 3.7–5.3)
RBC # BLD AUTO: 4.12 M/UL (ref 4–5.2)
SODIUM SERPL-SCNC: 138 MMOL/L (ref 135–144)
WBC OTHER # BLD: 15.6 K/UL (ref 3.5–11)

## 2024-10-22 PROCEDURE — 6360000002 HC RX W HCPCS

## 2024-10-22 PROCEDURE — 85025 COMPLETE CBC W/AUTO DIFF WBC: CPT

## 2024-10-22 PROCEDURE — 6370000000 HC RX 637 (ALT 250 FOR IP)

## 2024-10-22 PROCEDURE — 80048 BASIC METABOLIC PNL TOTAL CA: CPT

## 2024-10-22 PROCEDURE — 2580000003 HC RX 258

## 2024-10-22 PROCEDURE — 36415 COLL VENOUS BLD VENIPUNCTURE: CPT

## 2024-10-22 RX ADMIN — DIPHENHYDRAMINE HYDROCHLORIDE 25 MG: 50 INJECTION INTRAMUSCULAR; INTRAVENOUS at 08:46

## 2024-10-22 RX ADMIN — OXYCODONE HYDROCHLORIDE 5 MG: 5 TABLET ORAL at 08:47

## 2024-10-22 RX ADMIN — ENOXAPARIN SODIUM 40 MG: 100 INJECTION SUBCUTANEOUS at 08:46

## 2024-10-22 RX ADMIN — SODIUM CHLORIDE, PRESERVATIVE FREE 10 ML: 5 INJECTION INTRAVENOUS at 08:47

## 2024-10-22 RX ADMIN — PREGABALIN 100 MG: 100 CAPSULE ORAL at 08:47

## 2024-10-22 RX ADMIN — METOCLOPRAMIDE 10 MG: 5 INJECTION, SOLUTION INTRAMUSCULAR; INTRAVENOUS at 08:47

## 2024-10-22 RX ADMIN — Medication 3 MG: at 01:00

## 2024-10-22 RX ADMIN — Medication 2000 MG: at 04:04

## 2024-10-22 RX ADMIN — KETOROLAC TROMETHAMINE 15 MG: 15 INJECTION, SOLUTION INTRAMUSCULAR; INTRAVENOUS at 01:00

## 2024-10-22 ASSESSMENT — PAIN DESCRIPTION - LOCATION
LOCATION: ABDOMEN
LOCATION: ABDOMEN
LOCATION: PELVIS

## 2024-10-22 ASSESSMENT — PAIN DESCRIPTION - ORIENTATION
ORIENTATION: RIGHT

## 2024-10-22 ASSESSMENT — PAIN - FUNCTIONAL ASSESSMENT
PAIN_FUNCTIONAL_ASSESSMENT: ACTIVITIES ARE NOT PREVENTED

## 2024-10-22 ASSESSMENT — PAIN DESCRIPTION - ONSET
ONSET: PROGRESSIVE
ONSET: PROGRESSIVE

## 2024-10-22 ASSESSMENT — PAIN DESCRIPTION - DESCRIPTORS
DESCRIPTORS: ACHING
DESCRIPTORS: ACHING
DESCRIPTORS: SORE

## 2024-10-22 ASSESSMENT — PAIN DESCRIPTION - PAIN TYPE
TYPE: SURGICAL PAIN

## 2024-10-22 ASSESSMENT — PAIN DESCRIPTION - FREQUENCY
FREQUENCY: INTERMITTENT

## 2024-10-22 ASSESSMENT — PAIN SCALES - GENERAL
PAINLEVEL_OUTOF10: 4
PAINLEVEL_OUTOF10: 4
PAINLEVEL_OUTOF10: 2
PAINLEVEL_OUTOF10: 2

## 2024-10-22 NOTE — PROGRESS NOTES
Discharge instructions reviewed with patient, verbalized understanding./ Demonstrated how to empty reeves and how to swap out bags. Leg bag and 2,000ml bag provided as well as a hat for the toilet, graduated cylinder, leg strap, and alcohol swabs. Patient instructed on how to clamp reeves and how to remove reeves. Patient also educated on when to call office. Patient also educated on post op care.

## 2024-10-22 NOTE — PROGRESS NOTES
Gynecology Progress Note    Date: 10/22/2024  Time: 7:04 AM    Cally Ballesteros 43 y.o. female , POD # 1 s/p cystoscopy with Lynx sling insertion on 10/21/24    Patient seen and examined. She complained of nothing at this time. She is requesting discharge. Pain is controlled.  Patient is  tolerating oral intake. She is urinating well via reeves. 875 mL over the past 12 hours. Slightly decreased UOP since 1 AM, however patient has been sleeping with PO hydration.  She denies any vaginal bleeding. She is  ambulating without difficulty.  She is  passing flatus. She denies Fever/Chills, Chest Pain, SOB, N/V, Calf Pain.    The patient recalls from preoperative counseling and accepts that up to 26% of women may persist in these symptoms or develop de nelli incontinence. If preoperative bladder testing was negative, there was no indication for an incontinence procedure. The patient understands if these symptoms persist, further treatment may be recommended.    Vitals:  Vitals:    10/21/24 1908 10/21/24 1945 10/22/24 0100 10/22/24 0329   BP:  119/70 93/65 94/60   Pulse: 100 90 95 80   Resp: 15 13 14 16   Temp:  97.4 °F (36.3 °C) 98.4 °F (36.9 °C) 98.2 °F (36.8 °C)   TempSrc:  Oral Oral Oral   SpO2:  98% 98% 96%   Weight:       Height:         Intake/Output:   Last Shift: I/O last 3 completed shifts:  In: 730 [P.O.:720; I.V.:10]  Out: 1885 [Urine:1875; Blood:10]  Current Shift: No intake/output data recorded.  875 mL over the past 12 hours      Physical Exam:  General:  no apparent distress, alert and cooperative  Neurologic:  alert, oriented, normal speech, no focal findings or movement disorder noted  Lungs:  No increased work of breathing, good air exchange, clear to auscultation bilaterally, no crackles or wheezing  Heart:  normal S1 and S2, regular rate and rhythm and no murmur, rubs, or gallops  Abdomen: soft, non-distended, appropriate tenderness, no CVA tenderness  Incision: clean, dry, intact - two incision over

## 2024-10-22 NOTE — CARE COORDINATION
Case Management Assessment  Initial Evaluation    Date/Time of Evaluation: 10/22/2024 10:22 AM  Assessment Completed by: Ashley Mac    If patient is discharged prior to next notation, then this note serves as note for discharge by case management.    Patient Name: Cally Ballesteros                   YOB: 1980  Diagnosis: Urinary incontinence, mixed [N39.46]  Post-operative state [Z98.890]                   Date / Time: 10/21/2024  8:13 AM    Patient Admission Status: Outpatient in a bed   Readmission Risk (Low < 19, Mod (19-27), High > 27): Readmission Risk Score: 6.1    Current PCP: None, None  PCP verified by CM? No (has none, does not want a list)    Chart Reviewed: Yes      History Provided by: Patient  Patient Orientation: Alert and Oriented    Patient Cognition: Alert    Hospitalization in the last 30 days (Readmission):  No    If yes, Readmission Assessment in CM Navigator will be completed.    Advance Directives:      Code Status: Full Code   Patient's Primary Decision Maker is:  (\"speaks for her self\")      Discharge Planning:    Patient lives with: Children Type of Home: House  Primary Care Giver: Self  Patient Support Systems include: Children (lives with her 14 year old)   Current Financial resources: Medicaid  Current community resources: None  Current services prior to admission: None            Current DME:              Type of Home Care services:  None    ADLS  Prior functional level: Independent in ADLs/IADLs  Current functional level: Independent in ADLs/IADLs    PT AM-PAC:   /24  OT AM-PAC:   /24    Family can provide assistance at DC: No  Would you like Case Management to discuss the discharge plan with any other family members/significant others, and if so, who? No  Plans to Return to Present Housing: Yes  Other Identified Issues/Barriers to RETURNING to current housing: none  Potential Assistance needed at discharge:  (TBD)            Potential DME:    Patient expects to

## 2024-10-22 NOTE — PLAN OF CARE
Problem: Safety - Adult  Goal: Free from fall injury  Outcome: Progressing     Problem: Pain  Goal: Verbalizes/displays adequate comfort level or baseline comfort level  Outcome: Progressing  Flowsheets  Taken 10/22/2024 0405  Verbalizes/displays adequate comfort level or baseline comfort level:   Encourage patient to monitor pain and request assistance   Assess pain using appropriate pain scale   Administer analgesics based on type and severity of pain and evaluate response   Implement non-pharmacological measures as appropriate and evaluate response   Notify Licensed Independent Practitioner if interventions unsuccessful or patient reports new pain  Taken 10/22/2024 0100  Verbalizes/displays adequate comfort level or baseline comfort level:   Encourage patient to monitor pain and request assistance   Assess pain using appropriate pain scale   Administer analgesics based on type and severity of pain and evaluate response   Implement non-pharmacological measures as appropriate and evaluate response   Notify Licensed Independent Practitioner if interventions unsuccessful or patient reports new pain  Taken 10/21/2024 2049  Verbalizes/displays adequate comfort level or baseline comfort level:   Assess pain using appropriate pain scale   Administer analgesics based on type and severity of pain and evaluate response   Implement non-pharmacological measures as appropriate and evaluate response  Taken 10/21/2024 1945  Verbalizes/displays adequate comfort level or baseline comfort level:   Encourage patient to monitor pain and request assistance   Assess pain using appropriate pain scale   Implement non-pharmacological measures as appropriate and evaluate response   Administer analgesics based on type and severity of pain and evaluate response   Notify Licensed Independent Practitioner if interventions unsuccessful or patient reports new pain     Problem: Discharge Planning  Goal: Discharge to home or other facility with

## 2024-10-25 ENCOUNTER — TELEPHONE (OUTPATIENT)
Age: 44
End: 2024-10-25

## 2024-10-25 ENCOUNTER — HOSPITAL ENCOUNTER (EMERGENCY)
Age: 44
Discharge: HOME | End: 2024-10-25
Attending: EMERGENCY MEDICINE
Payer: MEDICAID

## 2024-10-25 VITALS
BODY MASS INDEX: 30.53 KG/M2 | HEIGHT: 66 IN | WEIGHT: 190 LBS | SYSTOLIC BLOOD PRESSURE: 112 MMHG | HEART RATE: 78 BPM | RESPIRATION RATE: 18 BRPM | TEMPERATURE: 98.2 F | DIASTOLIC BLOOD PRESSURE: 95 MMHG | OXYGEN SATURATION: 96 %

## 2024-10-25 DIAGNOSIS — R33.9 URINARY RETENTION: ICD-10-CM

## 2024-10-25 DIAGNOSIS — R33.9 URINARY RETENTION: Primary | ICD-10-CM

## 2024-10-25 DIAGNOSIS — N32.89 BLADDER SPASM: Primary | ICD-10-CM

## 2024-10-25 PROCEDURE — 99283 EMERGENCY DEPT VISIT LOW MDM: CPT

## 2024-10-25 PROCEDURE — 51702 INSERT TEMP BLADDER CATH: CPT

## 2024-10-25 RX ORDER — PHENAZOPYRIDINE HYDROCHLORIDE 200 MG/1
200 TABLET, FILM COATED ORAL 3 TIMES DAILY PRN
Qty: 9 TABLET | Refills: 0 | Status: SHIPPED | OUTPATIENT
Start: 2024-10-25 | End: 2024-10-28

## 2024-10-25 RX ORDER — SOLIFENACIN SUCCINATE 10 MG/1
10 TABLET, FILM COATED ORAL DAILY
Qty: 14 TABLET | Refills: 0 | Status: SHIPPED | OUTPATIENT
Start: 2024-10-24 | End: 2024-11-07

## 2024-10-25 ASSESSMENT — PAIN DESCRIPTION - LOCATION: LOCATION: PELVIS

## 2024-10-25 ASSESSMENT — PAIN - FUNCTIONAL ASSESSMENT: PAIN_FUNCTIONAL_ASSESSMENT: 0-10

## 2024-10-25 ASSESSMENT — PAIN SCALES - GENERAL: PAINLEVEL_OUTOF10: 10

## 2024-10-25 NOTE — DISCHARGE INSTRUCTIONS
You have presented to us with urinary retention and we did go ahead and put a Ellison catheter in you which got approximately 900 cc of urine out at this point in time you were going to be discharged home with a leg bag you should keep this in and talk to your surgeon about what they would like you to do next.  Return back to an ER if worsening in any way.    (Richard Chen III, MD,, MD  Attending Emergency Physician

## 2024-10-25 NOTE — TELEPHONE ENCOUNTER
Called and left detailed message informing her of new meds being called in.  
Sent in Keflex (prophylactic - to start after finishing the other keflex given after surgery), Vesicare and pyridium.   
Monday morning prior to her p/o appt. Encouraged patient to push her fluids prior to her appt. Patient v/u. Informed patient of her post op appt date and time and how to reach the on call Dr. Patient v/u Please send over pyridium or URO-MP to her pharmacy for bladder spasms.

## 2024-10-25 NOTE — ED PROVIDER NOTES
eMERGENCY dEPARTMENT eNCOUnter      Pt Name: Cally Ballesteros  MRN: [id number]  Birthdate 1980  Date of evaluation: 10/25/2024      CHIEF COMPLAINT       Chief Complaint   Patient presents with    Urinary Retention     Pt. States having bladder surgery on Monday with .pt states she had complications after the surgery, pt was unable to urinate and a reeves was placed. Pt had reeves removed yesterday and is unable to urinate all day  only dribbles and dysuria.           HISTORY OF PRESENT ILLNESS    Cally Ballesteros is a 43 y.o. female who presents to the emergency department for several hour history of urinary retention.  Patient had surgery to day ago and at that point in time was sent home with a catheter took the catheter out yesterday per  Instructions and gradual throughout the day was retaining urine unable to void more than a few drops at a time.  Per bladder scanner patient has about 900 cc of urine in her bladder.    REVIEW OF SYSTEMS       Constitutional: No fevers or chills  HEENT: No sore throat, rhinorrhea, or earache  Eyes: No blurry vision or double vision no drainage  Cardiovascular: No chest pain or tachycardia  Respiratory: No wheezing or shortness of breath no cough  Gastrointestinal: No nausea, vomiting, diarrhea, constipation, or abdominal pain   : No hematuria or dysuria, urinary retention  Musculoskeletal: No swelling or pain  Skin: No rash   Neurological: No focal neurologic complaints, paresthesias, weakness, or headache    PAST MEDICAL HISTORY    has a past medical history of Anxiety, Anxiety and depression, Dyspareunia, female, Migraine, Urine incontinence, Vaginal discharge, and Vulvar irritation.    SURGICAL HISTORY      has a past surgical history that includes Endometrial ablation (2014) and Urethral Surgery (N/A, 10/21/2024).    CURRENT MEDICATIONS       Discharge Medication List as of 10/25/2024  7:03 AM        CONTINUE these medications which have NOT CHANGED    Details    BP: (!) 112/95   Pulse: 78   Resp: 18   Temp: 98.2 °F (36.8 °C)   TempSrc: Oral   SpO2: 96%   Weight: 86.2 kg (190 lb)   Height: 1.676 m (5' 6\")     -------------------------  BP (!) 112/95   Pulse 78   Temp 98.2 °F (36.8 °C) (Oral)   Resp 18   Ht 1.676 m (5' 6\")   Wt 86.2 kg (190 lb)   SpO2 96%   BMI 30.67 kg/m²         I have reviewed the disposition diagnosis with the patient and or their family/guardian. I have answered their questions and given discharge instructions. They voiced understanding of these instructions and did not have any further questions or complaints.    CRITICAL CARE:    None    CONSULTS:    None    PROCEDURES:    None      OARRS Report if indicated             FINAL IMPRESSION      1. Urinary retention          DISPOSITION/PLAN   DISPOSITION Decision To Discharge  I have reviewed the disposition diagnosis with the patient and or their family/guardian.  I have answered their questions and given discharge instructions.  They voiced understanding of these instructions and did not have any further questions or complaints.      Reevaluation: Patient is feeling better after having been catheterized for approximately 900 cc of urine out of her bladder.  She is gone off being fitted with a leg bag and will be allowed to be discharged home to follow-up with her own doctors.  She is stable for discharge home.    PATIENT REFERRED TO:    Surgeon or clinic doctor within 2 days-empty the leg bag as necessary.          DISCHARGE MEDICATIONS:  Discharge Medication List as of 10/25/2024  7:03 AM          (Please note that portions of this note were completed with a voice recognition program.  Efforts were made to edit the dictations but occasionally words are mis-transcribed.)    Martínez Chen III, MD,  Attending Emergency Physician             Martínez Chen III, MD  10/26/24 1933

## 2024-10-28 ENCOUNTER — OFFICE VISIT (OUTPATIENT)
Age: 44
End: 2024-10-28
Payer: MEDICAID

## 2024-10-28 ENCOUNTER — HOSPITAL ENCOUNTER (OUTPATIENT)
Age: 44
Setting detail: SPECIMEN
Discharge: HOME | End: 2024-10-28

## 2024-10-28 VITALS
SYSTOLIC BLOOD PRESSURE: 106 MMHG | DIASTOLIC BLOOD PRESSURE: 78 MMHG | HEART RATE: 90 BPM | BODY MASS INDEX: 31.47 KG/M2 | WEIGHT: 195 LBS | OXYGEN SATURATION: 99 %

## 2024-10-28 DIAGNOSIS — R33.9 RETENTION OF URINE: Primary | ICD-10-CM

## 2024-10-28 DIAGNOSIS — Z09 POSTOPERATIVE EXAMINATION: ICD-10-CM

## 2024-10-28 DIAGNOSIS — N30.00 ACUTE CYSTITIS WITHOUT HEMATURIA: ICD-10-CM

## 2024-10-28 LAB
BILIRUBIN, POC: 1
BLOOD URINE, POC: ABNORMAL
CLARITY, POC: CLEAR
COLOR, POC: ABNORMAL
GLUCOSE URINE, POC: ABNORMAL MG/DL
KETONES, POC: ABNORMAL MG/DL
LEUKOCYTE EST, POC: ABNORMAL
NITRITE, POC: ABNORMAL
PH, POC: 6
PROTEIN, POC: 30 MG/DL
SPECIFIC GRAVITY, POC: 1
UROBILINOGEN, POC: 1 MG/DL

## 2024-10-28 PROCEDURE — 99024 POSTOP FOLLOW-UP VISIT: CPT | Performed by: NURSE PRACTITIONER

## 2024-10-28 PROCEDURE — 81003 URINALYSIS AUTO W/O SCOPE: CPT | Performed by: NURSE PRACTITIONER

## 2024-10-28 PROCEDURE — 51798 US URINE CAPACITY MEASURE: CPT | Performed by: NURSE PRACTITIONER

## 2024-10-28 NOTE — PROGRESS NOTES
One week post-op    Surgery Type:   CYSTOSCOPY URETHRAL LYNX SLING INSERTION     Surgery Date: 10/21/2024    Date urinary cathter was pulled\" 7:00 am today   Voiding w/o difficulty:no    Level of pain: 4  Pain medication;   Ibuprofen: 600mg  Tylenol:  n/a  Norco: n/a , date of last dose: n/a  Percocet: taking, date of last dose: 10/27/2024    Stool softener: not taking  Last bowel movement: 10/28/2024  Bowel movement description: loose/ diarrhea    Antibiotics given: yes  Antibiotics completed: yes    Did patient use:   Simethicone no  Flomax no    Vaginal bleeding: yes  Discharge: yes   
the next 6-12 months.  If lifting is unavoidable, the patient is instructed not to hold their breath but blow out as they lift to decrease abdominal and pelvic pressure.  The patient is aware if they choose to lift objects heavier than recommended or engage in unhealthy pelvic activity they may increase the risk of prolapse recurrence. An unhealthy return to smoking or poorly controlled diabetes may also hasten surgical failure. The patient understands that it is up to her to have common sense in regard to her daily activities.  More strain on her surgical site may lead to suboptimal long-term healing.  The patient realizes it is impossible to mention every activity that could be deleterious to her long-term postoperative recovery.    The patient understands to call the office if there is a sustained fever >100.4F, unimproved pain, heavy vaginal bleeding or discharge, or dysfunction in eliminating urine or feces.     Preventative care: Disclaimer: - This note is created with the assistance of a speech recognition program.  While intending to generate a timely document that accurately reflects the content of the visit, there is no guarantee every grammatical, syntax, or spelling error has been or will be identified or corrected.  Additionally, system limitations of the EMR beyond the control of the practitioner may cause unintentional errors or omissions not identified or corrected at the time of record finalization and signature.    Multiple records reviewed. All questions were addressed to the patient's satisfaction.    Follow up: Return next postop scheduled.     Electronically signed by DALILA Go CNP on 10/28/2024 at 10:58 AM

## 2024-10-29 LAB
MICROORGANISM SPEC CULT: NO GROWTH
SPECIMEN DESCRIPTION: NORMAL

## 2024-10-30 ENCOUNTER — TELEPHONE (OUTPATIENT)
Age: 44
End: 2024-10-30

## 2024-10-30 NOTE — TELEPHONE ENCOUNTER
Patient is still ISC. Patient states she is only voiding on her own < 100 ml each time and is still ISC after each urination. Patient was calling to check on her UA results. Patient was informed her urine was clear no growth. Patient states it is burning really bad when urinating. She has taken the last pyridium today and wanted to know if another script could be sent to [facility] in [place]. Patient has a f/u on Monday. Please advise?

## 2024-11-04 ENCOUNTER — TELEPHONE (OUTPATIENT)
Age: 44
End: 2024-11-04

## 2024-11-04 NOTE — TELEPHONE ENCOUNTER
Called patient to check in to see if she was running behind or forgotten about her 10 am PO appt she had this morning. Called but was not able to reach patient left and detailed message for patient to give the office and call.

## 2024-11-04 NOTE — PROGRESS NOTES
months.  If lifting is unavoidable, the patient is instructed not to hold their breath but blow out as they lift to decrease abdominal and pelvic pressure.  The patient is aware if they choose to lift objects heavier than recommended or engage in unhealthy pelvic activity they may increase the risk of prolapse recurrence. An unhealthy return to smoking or poorly controlled diabetes may also hasten surgical failure. The patient understands that it is up to her to have common sense in regard to her daily activities.  More strain on her surgical site may lead to suboptimal long-term healing.  The patient realizes it is impossible to mention every activity that could be deleterious to her long-term postoperative recovery.    The patient understands to call the office if there is a sustained fever >100.4F, unimproved pain, heavy vaginal bleeding or discharge, or dysfunction in eliminating urine or feces.     Preventative care: Disclaimer: - This note is created with the assistance of a speech recognition program.  While intending to generate a timely document that accurately reflects the content of the visit, there is no guarantee every grammatical, syntax, or spelling error has been or will be identified or corrected.  Additionally, system limitations of the EMR beyond the control of the practitioner may cause unintentional errors or omissions not identified or corrected at the time of record finalization and signature.    Multiple records reviewed. All questions were addressed to the patient's satisfaction.    Follow up: No follow-ups on file.     Electronically signed by Steffen Mayo DO on 11/5/2024 at 11:12 AM

## 2024-11-05 ENCOUNTER — OFFICE VISIT (OUTPATIENT)
Age: 44
End: 2024-11-05

## 2024-11-05 ENCOUNTER — ANESTHESIA EVENT (OUTPATIENT)
Dept: OPERATING ROOM | Age: 44
End: 2024-11-05
Payer: MEDICAID

## 2024-11-05 VITALS
TEMPERATURE: 97.7 F | SYSTOLIC BLOOD PRESSURE: 109 MMHG | OXYGEN SATURATION: 99 % | DIASTOLIC BLOOD PRESSURE: 84 MMHG | BODY MASS INDEX: 31.47 KG/M2 | HEIGHT: 66 IN | HEART RATE: 77 BPM

## 2024-11-05 DIAGNOSIS — Z41.9 ELECTIVE SURGERY: ICD-10-CM

## 2024-11-05 DIAGNOSIS — Z98.890 POST-OPERATIVE STATE: ICD-10-CM

## 2024-11-05 DIAGNOSIS — R33.9 RETENTION OF URINE: Primary | ICD-10-CM

## 2024-11-05 LAB
BILIRUBIN, POC: 1
BLOOD URINE, POC: ABNORMAL
CLARITY, POC: ABNORMAL
COLOR, POC: ABNORMAL
GLUCOSE URINE, POC: ABNORMAL MG/DL
KETONES, POC: ABNORMAL MG/DL
LEUKOCYTE EST, POC: 500
NITRITE, POC: ABNORMAL
PH, POC: 7
PROTEIN, POC: 30 MG/DL
SPECIFIC GRAVITY, POC: 1.01
UROBILINOGEN, POC: 1 MG/DL

## 2024-11-05 RX ORDER — TRAZODONE HYDROCHLORIDE 50 MG/1
50 TABLET, FILM COATED ORAL NIGHTLY
COMMUNITY
Start: 2024-10-30

## 2024-11-05 RX ORDER — VILAZODONE HYDROCHLORIDE 20 MG/1
20 TABLET ORAL DAILY
COMMUNITY
Start: 2024-10-30

## 2024-11-05 RX ORDER — TAMSULOSIN HYDROCHLORIDE 0.4 MG/1
0.4 CAPSULE ORAL DAILY
COMMUNITY
Start: 2024-10-30

## 2024-11-05 NOTE — DISCHARGE INSTRUCTIONS
[facility] UROGYNECOLOGY & PELVIC REHABILITATION     POSTOPERATIVE PATIENT INSTRUCTIONS  MAJOR & MINOR SURGICAL PROCEDURES      Congratulations! You have taken the brave step of undergoing surgery in order to try to improve your health.  Now it is time to focus on healing outside of the hospital / surgery center setting.  To make your dismissal as successful as possible, it is recommended that you thoroughly review these postoperative instructions. These instructions pertain to, but are not limited to the following procedures:      MAJOR   Hysterectomy - Vaginal / Laparoscopic / Robotic   With or Without tube-ovarian Removal (Salpingo-oophorectomy)   Sacrocolpopexy / Sacroperineopexy / Sacrocervicopexy/ Hysteropexy (lifting apex to sacrum)  Major Vaginal Prolapse repairs   Cystocele repair (Anterior Colporrhaphy)   Rectocele repair (Posterior Colporrhaphy)   Enterocele repair    Vaginal vault repair   Closing or removal of the vagina (Colpocleisis / Colpectomy)   Major urinary incontinence surgery (Hylton Urethropexy, MMK)   Major fibroid removal (Myomectomy)   Major tubal surgery (re-anastomosis, ectopic pregnancy, pelvic inflammatory disease)   Major surgery for adhesions or endometriosis involving bowel   Major vaginal mesh removal    Fistula repair of the bladder or colorectum to the vagina   Creation of a new vagina (Neovaginoplasty) or repair of a Mullerian Anomaly   Other       MINOR   Hysteroscopy with Dilatation / Curettage, Endometrial Ablation / Hysterosalpingogram (HSG)  Laparoscopy With or Without minor tubal or ovarian surgery   Minor Vaginal Prolapse repairs   Cystocele repair (Anterior Colporrhaphy)   Rectocele repair (Posterior Colporrhaphy)   Urethrocele repair    Minor urinary incontinence surgery (Slings, Transurethral Bulking)   Minor vaginal surgery (Mesh removal, Laser ablation, Biopsies, Labial revisions, Injections)    Minor surgery of the bladder (DMSO, Hydrodistention, Botox, etc.)

## 2024-11-05 NOTE — H&P
UroGyn Pre-Op H&P  [facility]    Patient Name: Cally Ballesteros     Patient : 1980  [facility] [place] [facility]  Admission Date/Time: 2024  6:05 AM  Primary Care Physician: None, None  MRN: [id number]    Date: 2024  Time: 6:49 AM    The patient was seen in pre-op holding. She is here for revision of urethral sling insertion - sling lysis due to post operative urinary retention.    The patient is being admitted for a planned elective surgical procedure today. She has had symptoms, physical findings, and diagnostic testing that have an appropriate indication for today's planned procedure. The patient has been educated about their condition, conservative and surgical options was been offered to the patient, and the patient has decided to proceed with a surgical option. An informed consent has been signed under no duress or confusion. If indicated, the patient has been surgically cleared by her PCP and /or any pertinent specialist. All labs and testing have been reviewed. If of reproductive age and without permanent sterilization, a pregnancy test was confirmed as negative. The patient has satisfactorily completed any pre-operative preparations prior to surgery. If MRSA positive, she had completed the standard surgical preparation protocol. The patient took any required medicines prior to surgery with a sip of water but otherwise had taken nothing by mouth. The patient has discontinued any blood thinners as required to proceed with surgery. The patient denies chest pain, shortness of breath, calf pain, or open sores on the day of surgery. All dentures and body jewelry have been removed and / or taped.      REVIEW OF SYSTEMS:  14 point ROS negative except for above listed in HPI.   General/Constitutional: Denies chills, fever, headaches, weight gain, weight loss   Ophthalmologic: Denies recent abrupt vision changes, blurry vision   ENT: Denies nasal discharge, congestion, sinus  Discharged on 10/22/2024   Component Date Value Ref Range Status    Hemoglobin 10/21/2024 13.5  12.0 - 16.0 g/dL Final    Hematocrit 10/21/2024 39.7  36 - 46 % Final    Sodium 10/21/2024 133 (L)  135 - 144 mmol/L Final    Potassium 10/21/2024 4.0  3.7 - 5.3 mmol/L Final    Chloride 10/21/2024 101  98 - 107 mmol/L Final    CO2 10/21/2024 23  20 - 31 mmol/L Final    Anion Gap 10/21/2024 9  9 - 17 mmol/L Final    Glucose 10/21/2024 135 (H)  70 - 99 mg/dL Final    BUN 10/21/2024 13  6 - 20 mg/dL Final    Creatinine 10/21/2024 0.6  0.5 - 0.9 mg/dL Final    Est, Glom Filt Rate 10/21/2024 >90  >60 mL/min/1.73m2 Final    Comment:       These results are not intended for use in patients <18 years of age.        eGFR results are calculated without a race factor using the 2021 CKD-EPI equation.  Careful clinical correlation is recommended, particularly when comparing to results   calculated using previous equations.  The CKD-EPI equation is less accurate in patients with extremes of muscle mass, extra-renal   metabolism of creatine, excessive creatine ingestion, or following therapy that affects   renal tubular secretion.      Calcium 10/21/2024 9.1  8.6 - 10.4 mg/dL Final    Sodium 10/22/2024 138  135 - 144 mmol/L Final    Potassium 10/22/2024 3.8  3.7 - 5.3 mmol/L Final    Chloride 10/22/2024 105  98 - 107 mmol/L Final    CO2 10/22/2024 24  20 - 31 mmol/L Final    Anion Gap 10/22/2024 9  9 - 17 mmol/L Final    Glucose 10/22/2024 138 (H)  70 - 99 mg/dL Final    BUN 10/22/2024 13  6 - 20 mg/dL Final    Creatinine 10/22/2024 0.7  0.5 - 0.9 mg/dL Final    Est, Glom Filt Rate 10/22/2024 >90  >60 mL/min/1.73m2 Final    Comment:       These results are not intended for use in patients <18 years of age.        eGFR results are calculated without a race factor using the 2021 CKD-EPI equation.  Careful clinical correlation is recommended, particularly when comparing to results   calculated using previous equations.  The CKD-EPI

## 2024-11-05 NOTE — DISCHARGE SUMMARY
Urogynecology Discharge Summary  [facility]      Patient Name: Cally Ballesteros  Patient : 1980  Primary Care Physician: None, None  Admit Date: 2024    Principal Diagnosis: urinary retention    Other Diagnosis:   Urinary retention [R33.9]  Patient Active Problem List   Diagnosis    Meningitis    Acute nonintractable headache    Post-operative state     Infection: No  Hospital Acquired: No    Surgical Operations & Procedures: Revision of urethral sling insertion, sling lysis    Consultations: Anesthesia    Pertinent Findings & Procedures:   Cally Ballesteros is a 43 y.o. female , admitted for surgical management of urinary retention in post op state; received Ancef and pyridium preop.  She underwent revision of urethral sling insertion - sling lysis on 24. Post-operatively, patient voided 500 ml and 400 mL . She was discharged home without a reeves catheter. Post-op course normal, discharged home on POD#0.  Follow up in 1 week. Discharge instructions reviewed and questions answered.    Course of patient: normal    Discharge to: Home    Readmission planned: No    Recommendations on Discharge:     Medications:     Medication List        START taking these medications      fluconazole 150 MG tablet  Commonly known as: DIFLUCAN  Take 1 tablet by mouth once for 1 dose Take after completing keflex     oxyCODONE-acetaminophen 5-325 MG per tablet  Commonly known as: Percocet  Take 1 tablet by mouth every 6 hours as needed for Pain for up to 6 doses. Take lowest dose possible to manage pain Max Daily Amount: 4 tablets     senna-docusate 8.6-50 MG per tablet  Commonly known as: Senokot S  Take 2 tablets by mouth nightly            CHANGE how you take these medications      cephALEXin 250 MG capsule  Commonly known as: KEFLEX  Take 2 capsules by mouth daily for 5 days Start this AFTER completing other Keflex.  What changed: how much to take            CONTINUE taking these medications

## 2024-11-05 NOTE — BRIEF OP NOTE
Brief Operative Note  Department of Obstetrics and Gynecology  [facility]     Patient: Cally Ballesteros   : 1980  MRN: [id number]       Acct: [id number]   Date of Procedure: 24     Pre-operative Diagnosis: 43 y.o. female    Urinary retention  S/p Lynx sling insertion 10/21/24  BMI 31    Post-operative Diagnosis:   Urinary retention  S/p Lynx sling insertion 10/21/24  BMI 31    Procedure: revision of urethral sling insertion - sling lysis    Surgeon: Dr. Mayo     Assistant(s): Aurelia Costello MD, PGY4; Hayley Marroquin, PGY2    Anesthesia: MAC    Findings:  Normal appearing external genitalia and vaginal mucosa. Healed area from prior sling without evidence of infection. Successful sling release  Total IV fluids/Blood products:  500 ml crystalloid  Urine Output:  voided preop  Estimated blood loss:  10 mL  Drains:  none  Specimens:  none  Instrument and Sponge Count: Correct  Complications:  none  Condition:  good, transferred to post anesthesia recovery    Aurelia Costello MD  Ob/Gyn Resident  2024, 7:53 AM

## 2024-11-06 ENCOUNTER — HOSPITAL ENCOUNTER (OUTPATIENT)
Age: 44
Setting detail: OUTPATIENT SURGERY
Discharge: HOME | End: 2024-11-06
Attending: OBSTETRICS & GYNECOLOGY | Admitting: OBSTETRICS & GYNECOLOGY
Payer: MEDICAID

## 2024-11-06 ENCOUNTER — ANESTHESIA (OUTPATIENT)
Dept: OPERATING ROOM | Age: 44
End: 2024-11-06
Payer: MEDICAID

## 2024-11-06 VITALS
RESPIRATION RATE: 10 BRPM | BODY MASS INDEX: 31.74 KG/M2 | SYSTOLIC BLOOD PRESSURE: 107 MMHG | DIASTOLIC BLOOD PRESSURE: 81 MMHG | HEART RATE: 90 BPM | HEIGHT: 66 IN | TEMPERATURE: 97.4 F | OXYGEN SATURATION: 100 % | WEIGHT: 197.5 LBS

## 2024-11-06 DIAGNOSIS — G89.18 POST-OP PAIN: Primary | ICD-10-CM

## 2024-11-06 DIAGNOSIS — N32.89 BLADDER SPASM: ICD-10-CM

## 2024-11-06 DIAGNOSIS — R33.9 URINARY RETENTION: ICD-10-CM

## 2024-11-06 LAB — HCG SERPL QL: NEGATIVE

## 2024-11-06 PROCEDURE — 6370000000 HC RX 637 (ALT 250 FOR IP)

## 2024-11-06 PROCEDURE — 57288 REPAIR BLADDER DEFECT: CPT | Performed by: OBSTETRICS & GYNECOLOGY

## 2024-11-06 PROCEDURE — 3600000012 HC SURGERY LEVEL 2 ADDTL 15MIN: Performed by: OBSTETRICS & GYNECOLOGY

## 2024-11-06 PROCEDURE — 2580000003 HC RX 258: Performed by: STUDENT IN AN ORGANIZED HEALTH CARE EDUCATION/TRAINING PROGRAM

## 2024-11-06 PROCEDURE — 3700000000 HC ANESTHESIA ATTENDED CARE: Performed by: OBSTETRICS & GYNECOLOGY

## 2024-11-06 PROCEDURE — 2500000003 HC RX 250 WO HCPCS: Performed by: NURSE ANESTHETIST, CERTIFIED REGISTERED

## 2024-11-06 PROCEDURE — 3600000002 HC SURGERY LEVEL 2 BASE: Performed by: OBSTETRICS & GYNECOLOGY

## 2024-11-06 PROCEDURE — 2500000003 HC RX 250 WO HCPCS: Performed by: OBSTETRICS & GYNECOLOGY

## 2024-11-06 PROCEDURE — 7100000010 HC PHASE II RECOVERY - FIRST 15 MIN: Performed by: OBSTETRICS & GYNECOLOGY

## 2024-11-06 PROCEDURE — 7100000011 HC PHASE II RECOVERY - ADDTL 15 MIN: Performed by: OBSTETRICS & GYNECOLOGY

## 2024-11-06 PROCEDURE — 6360000002 HC RX W HCPCS: Performed by: NURSE ANESTHETIST, CERTIFIED REGISTERED

## 2024-11-06 PROCEDURE — 2709999900 HC NON-CHARGEABLE SUPPLY: Performed by: OBSTETRICS & GYNECOLOGY

## 2024-11-06 PROCEDURE — 84703 CHORIONIC GONADOTROPIN ASSAY: CPT

## 2024-11-06 PROCEDURE — 3700000001 HC ADD 15 MINUTES (ANESTHESIA): Performed by: OBSTETRICS & GYNECOLOGY

## 2024-11-06 PROCEDURE — 6360000002 HC RX W HCPCS

## 2024-11-06 RX ORDER — LIDOCAINE HYDROCHLORIDE 10 MG/ML
1 INJECTION, SOLUTION EPIDURAL; INFILTRATION; INTRACAUDAL; PERINEURAL
Status: DISCONTINUED | OUTPATIENT
Start: 2024-11-06 | End: 2024-11-06 | Stop reason: HOSPADM

## 2024-11-06 RX ORDER — SODIUM CHLORIDE 0.9 % (FLUSH) 0.9 %
5-40 SYRINGE (ML) INJECTION EVERY 12 HOURS SCHEDULED
Status: DISCONTINUED | OUTPATIENT
Start: 2024-11-06 | End: 2024-11-06 | Stop reason: HOSPADM

## 2024-11-06 RX ORDER — AMOXICILLIN 250 MG
2 CAPSULE ORAL
Qty: 60 TABLET | Refills: 1 | Status: SHIPPED | OUTPATIENT
Start: 2024-11-06 | End: 2024-11-13

## 2024-11-06 RX ORDER — GLYCOPYRROLATE 0.2 MG/ML
INJECTION INTRAMUSCULAR; INTRAVENOUS
Status: DISCONTINUED | OUTPATIENT
Start: 2024-11-06 | End: 2024-11-06 | Stop reason: SDUPTHER

## 2024-11-06 RX ORDER — KETOROLAC TROMETHAMINE 30 MG/ML
INJECTION, SOLUTION INTRAMUSCULAR; INTRAVENOUS
Status: DISCONTINUED | OUTPATIENT
Start: 2024-11-06 | End: 2024-11-06 | Stop reason: SDUPTHER

## 2024-11-06 RX ORDER — OXYCODONE AND ACETAMINOPHEN 5; 325 MG/1; MG/1
1 TABLET ORAL EVERY 6 HOURS PRN
Qty: 6 TABLET | Refills: 0 | Status: SHIPPED | OUTPATIENT
Start: 2024-11-06 | End: 2024-11-10

## 2024-11-06 RX ORDER — PHENAZOPYRIDINE HYDROCHLORIDE 100 MG/1
100 TABLET, FILM COATED ORAL ONCE
Status: COMPLETED | OUTPATIENT
Start: 2024-11-06 | End: 2024-11-06

## 2024-11-06 RX ORDER — SODIUM CHLORIDE 9 MG/ML
INJECTION, SOLUTION INTRAVENOUS PRN
Status: DISCONTINUED | OUTPATIENT
Start: 2024-11-06 | End: 2024-11-06 | Stop reason: HOSPADM

## 2024-11-06 RX ORDER — ONDANSETRON 2 MG/ML
INJECTION INTRAMUSCULAR; INTRAVENOUS
Status: DISCONTINUED | OUTPATIENT
Start: 2024-11-06 | End: 2024-11-06 | Stop reason: SDUPTHER

## 2024-11-06 RX ORDER — SODIUM CHLORIDE 0.9 % (FLUSH) 0.9 %
5-40 SYRINGE (ML) INJECTION PRN
Status: DISCONTINUED | OUTPATIENT
Start: 2024-11-06 | End: 2024-11-06 | Stop reason: HOSPADM

## 2024-11-06 RX ORDER — MIDAZOLAM HYDROCHLORIDE 1 MG/ML
INJECTION, SOLUTION INTRAMUSCULAR; INTRAVENOUS
Status: DISCONTINUED | OUTPATIENT
Start: 2024-11-06 | End: 2024-11-06 | Stop reason: SDUPTHER

## 2024-11-06 RX ORDER — SODIUM CHLORIDE, SODIUM LACTATE, POTASSIUM CHLORIDE, CALCIUM CHLORIDE 600; 310; 30; 20 MG/100ML; MG/100ML; MG/100ML; MG/100ML
INJECTION, SOLUTION INTRAVENOUS CONTINUOUS
Status: DISCONTINUED | OUTPATIENT
Start: 2024-11-06 | End: 2024-11-06 | Stop reason: HOSPADM

## 2024-11-06 RX ORDER — PROPOFOL 10 MG/ML
INJECTION, EMULSION INTRAVENOUS
Status: DISCONTINUED | OUTPATIENT
Start: 2024-11-06 | End: 2024-11-06 | Stop reason: SDUPTHER

## 2024-11-06 RX ORDER — LIDOCAINE HYDROCHLORIDE AND EPINEPHRINE 10; 10 MG/ML; UG/ML
INJECTION, SOLUTION INFILTRATION; PERINEURAL
Status: DISCONTINUED
Start: 2024-11-06 | End: 2024-11-06 | Stop reason: HOSPADM

## 2024-11-06 RX ORDER — FLUCONAZOLE 150 MG/1
150 TABLET ORAL ONCE
Qty: 1 TABLET | Refills: 0 | Status: SHIPPED | OUTPATIENT
Start: 2024-11-06 | End: 2024-11-06

## 2024-11-06 RX ORDER — IBUPROFEN 600 MG/1
600 TABLET, FILM COATED ORAL EVERY 6 HOURS PRN
Qty: 120 TABLET | Refills: 0 | Status: SHIPPED | OUTPATIENT
Start: 2024-11-06 | End: 2024-12-06

## 2024-11-06 RX ORDER — LIDOCAINE HYDROCHLORIDE AND EPINEPHRINE 10; 10 MG/ML; UG/ML
INJECTION, SOLUTION INFILTRATION; PERINEURAL PRN
Status: DISCONTINUED | OUTPATIENT
Start: 2024-11-06 | End: 2024-11-06 | Stop reason: ALTCHOICE

## 2024-11-06 RX ORDER — SCOLOPAMINE TRANSDERMAL SYSTEM 1 MG/1
1 PATCH, EXTENDED RELEASE TRANSDERMAL
Status: DISCONTINUED | OUTPATIENT
Start: 2024-11-06 | End: 2024-11-06 | Stop reason: HOSPADM

## 2024-11-06 RX ORDER — LIDOCAINE HYDROCHLORIDE 10 MG/ML
INJECTION, SOLUTION INFILTRATION; PERINEURAL
Status: DISCONTINUED
Start: 2024-11-06 | End: 2024-11-06 | Stop reason: HOSPADM

## 2024-11-06 RX ORDER — DEXAMETHASONE SODIUM PHOSPHATE 10 MG/ML
INJECTION, SOLUTION INTRAMUSCULAR; INTRAVENOUS
Status: DISCONTINUED | OUTPATIENT
Start: 2024-11-06 | End: 2024-11-06 | Stop reason: SDUPTHER

## 2024-11-06 RX ORDER — LIDOCAINE HYDROCHLORIDE 10 MG/ML
INJECTION, SOLUTION EPIDURAL; INFILTRATION; INTRACAUDAL; PERINEURAL
Status: DISCONTINUED | OUTPATIENT
Start: 2024-11-06 | End: 2024-11-06 | Stop reason: SDUPTHER

## 2024-11-06 RX ADMIN — ONDANSETRON 4 MG: 2 INJECTION INTRAMUSCULAR; INTRAVENOUS at 07:24

## 2024-11-06 RX ADMIN — PHENAZOPYRIDINE HYDROCHLORIDE 100 MG: 100 TABLET ORAL at 06:51

## 2024-11-06 RX ADMIN — SODIUM CHLORIDE, POTASSIUM CHLORIDE, SODIUM LACTATE AND CALCIUM CHLORIDE: 600; 310; 30; 20 INJECTION, SOLUTION INTRAVENOUS at 07:19

## 2024-11-06 RX ADMIN — GLYCOPYRROLATE 0.2 MG: 0.2 INJECTION INTRAMUSCULAR; INTRAVENOUS at 07:21

## 2024-11-06 RX ADMIN — PROPOFOL 150 MCG/KG/MIN: 10 INJECTION, EMULSION INTRAVENOUS at 07:21

## 2024-11-06 RX ADMIN — DEXAMETHASONE SODIUM PHOSPHATE 10 MG: 10 INJECTION INTRAMUSCULAR; INTRAVENOUS at 07:24

## 2024-11-06 RX ADMIN — KETOROLAC TROMETHAMINE 30 MG: 30 INJECTION, SOLUTION INTRAMUSCULAR at 07:24

## 2024-11-06 RX ADMIN — MIDAZOLAM 2 MG: 1 INJECTION INTRAMUSCULAR; INTRAVENOUS at 07:19

## 2024-11-06 RX ADMIN — LIDOCAINE HYDROCHLORIDE 50 MG: 10 INJECTION, SOLUTION EPIDURAL; INFILTRATION; INTRACAUDAL; PERINEURAL at 07:21

## 2024-11-06 RX ADMIN — Medication 2000 MG: at 07:21

## 2024-11-06 ASSESSMENT — PAIN DESCRIPTION - DESCRIPTORS: DESCRIPTORS: ACHING;DULL;DISCOMFORT

## 2024-11-06 ASSESSMENT — PAIN - FUNCTIONAL ASSESSMENT: PAIN_FUNCTIONAL_ASSESSMENT: 0-10

## 2024-11-06 NOTE — ANESTHESIA POSTPROCEDURE EVALUATION
Department of Anesthesiology  Postprocedure Note    Patient: Cally Ballesteros  MRN: [id number]  YOB: 1980  Date of evaluation: 11/6/2024    Procedure Summary       Date: 11/06/24 Room / Location: [address]    Anesthesia Start: 0719 Anesthesia Stop: 0755    Procedure: REVISION URETHRAL SLING INSERTION - SLING LYSIS Diagnosis:       Urinary retention      (Urinary retention [R33.9])    Surgeons: Steffen Mayo DO Responsible Provider: Joanne Sharpe MD    Anesthesia Type: MAC ASA Status: 2            Anesthesia Type: No value filed.    Berry Phase I: Berry Score: 10    Berry Phase II: Berry Score: 9    Anesthesia Post Evaluation    Patient location during evaluation: PACU  Patient participation: complete - patient participated  Level of consciousness: awake and alert  Airway patency: patent  Nausea & Vomiting: no nausea and no vomiting  Cardiovascular status: blood pressure returned to baseline  Respiratory status: acceptable and room air  Hydration status: euvolemic  Pain management: adequate and satisfactory to patient    No notable events documented.

## 2024-11-06 NOTE — ANESTHESIA PRE PROCEDURE
Department of Anesthesiology  Preprocedure Note       Name:  Cally Ballesteros   Age:  43 y.o.  :  1980                                          MRN:  [id number]         Date:  2024      Surgeon: Surgeon(s):  Steffen Mayo DO    Procedure: Procedure(s):  REVISION URETHRAL SLING INSERTION - SLING LYSIS    Medications prior to admission:   Prior to Admission medications    Medication Sig Start Date End Date Taking? Authorizing Provider   cephALEXin (KEFLEX) 250 MG capsule Take 2 capsules by mouth daily for 14 days Start this AFTER completing other Keflex. 24 Yes Aurelia Costello MD   ibuprofen (ADVIL;MOTRIN) 600 MG tablet Take 1 tablet by mouth every 6 hours as needed for Pain 24 Yes Aurelia Costello MD   VIIBRYD 20 MG Tablet Take 1 tablet by mouth daily 10/30/24  Yes ProviderMonika MD   traZODone (DESYREL) 50 MG tablet Take 1 tablet by mouth nightly 10/30/24  Yes Provider, MD Monika   cyclobenzaprine (FLEXERIL) 10 MG tablet Take 1 tablet by mouth 3 times daily as needed   Yes Provider, MD Monika   pregabalin (LYRICA) 100 MG capsule Take 1 capsule by mouth 2 times daily.   Yes ProviderMonika MD   tamsulosin (FLOMAX) 0.4 MG capsule Take 1 capsule by mouth daily 10/30/24   ProviderMonika MD   solifenacin (VESICARE) 10 MG tablet Take 1 tablet by mouth daily for 14 days 10/24/24 11/7/24  Loreto Salinas APRN - CNP   clonazePAM (KLONOPIN) 0.5 MG tablet Take 1 tablet by mouth 2 times daily as needed. 24   ProviderMonika MD   tiZANidine (ZANAFLEX) 2 MG tablet Take 1 tablet by mouth nightly as needed (nightly for neck pain stifness) 7/3/24   Mary Ball MD       Current medications:    Current Facility-Administered Medications   Medication Dose Route Frequency Provider Last Rate Last Admin    ceFAZolin (ANCEF) 2000 mg in sterile water 20 mL IV syringe  2,000 mg IntraVENous Once Aurelia Costello MD        lidocaine PF 1 % injection 1 mL  1 mL

## 2024-11-06 NOTE — OP NOTE
the appropriate induction was successfully completed for the patient's projected procedure.  The patient was then placed in the appropriate operative position, which was confirmed by me to be neutrally positioned and free of any pressure points or unusual strain on the joints or limbs.  For significant musculoskeletal limitations, the patient was placed in the operative position prior to induction of anesthesia to confirm no pain on the areas of concern. The patient's face was protected per anesthesia.  The patient was surgically prepped with a sterile solution appropriate for the patient and the appropriate amount of time was observed for the sterile prep to set.  The patient was draped sterilely for the procedure.  Surgical equipment was checked before acknowledging the start of the procedure. The bladder was drained with in and out catheterization as necessary.     A weighted speculum was placed in the vagina the patient was repositioned in slight Trendelenburg.  Allis clamps were used to grab the periurethral angles of the vaginal mucosa to identify the prior sling incision.  This incisional area was infiltrated with 5 mL of 1% lidocaine with epinephrine.  A vertical incision within the 15 blade scalpel was made and Metzenbaum scissor dissection helped to identify the incontinence sling.  The sling had idiopathically tightened to a thin band and seem to migrate more towards the mid to distal urethra.  This was not the case during initial sling placement where the sling was placed with a flat lie at the mid urethra to bladder neck under the urethral sphincter and a wide ridden distribution was ensured with Vicryl stay sutures.  The high tension sling was identified and cut in the middle to release tension while still providing necessary support and integration on the periurethral tissues bilaterally that in scientific literature serves to still maintain improvement in incontinence.  Valsalva credes suprapubically  did not show any leakage and a hemostat was able to be inserted up through the urethra easily without any stenosis.  There was no need for a cystoscope.  Incisional area was irrigated and then closed with 2-0 Vicryl running suture.  The bladder was Somewhat full so the patient will be able to expedite a voiding trial postoperatively with a postvoid residual measurement.    The operative procedure was felt to be complete. I was present for and completed the critical and key portions of the entire surgery/procedure/ OR management and I was immediately available to assist during resident training as appropriate for the case. All incisions were hemostatic, and sponge and needle counts were correct. All external incisions were sealed with appropriate bandaging. A post operative time out occurred. The patient was transferred to a post anesthesia bed and was taken to the recovery unit in stable fashion. An inspection of the patient's head, neck, torso, and limbs revealed no undue strain swelling, bruising, or edema .The patient tolerated the procedure well.  An attempt to discuss the outcome of the surgery with family/friends/caregivers was done by personal consultation.  The patient's expert  was present for consultation and per patient request he was only told that she did well with the procedure.  No specific details were given.    Pending stability, the patient will be dismissed home. The patient has confirmed review and understanding of paper postoperative instructions given to them by the office. The patient was given another link to the video version of the instructions and friends and family were shown the video links as well if needed. The patient will receive a welfare check phone call at week's end and have a postoperative appointment confirmed in 1-2 weeks. The patient will go home on standard postoperative medicines and may start most to all home medicines with possible exception of aggressive

## 2024-11-08 ENCOUNTER — TELEPHONE (OUTPATIENT)
Age: 44
End: 2024-11-08

## 2024-11-08 NOTE — TELEPHONE ENCOUNTER
POST OP CALL RECORD      Date:  2024   Time:  12:03 PM   Patient:  Cally Ballesteros   :  1980   Procedure:   REVISION URETHRAL SLING INSERTION - SLING LYSIS     Procedure Date: 2024     How are you feeling?  Tired and cramping   Are you voiding ok?  yes  Is your Catheter draining?   Yes  Passing Gas?  Yes  Last BM?  2024  If no BM, what have you done to encourage?  Increased fluids  Any Nausea/Vomiting/Diarrhea?  No    Diet: normal  Fever:  No  Pain: Yes, 4  What pain medication are you taking? Ibuprofen   Post-Op visit Scheduled for follow up? Visit date not found     Comments?  Patient is doing well. Patient is very happy that she is voiding and does not need to self cath anymore. Patient is c/o feeling tired and cramping. Patient is passing gas and has had a bowel movement since the procedure. Patient states her diet is normal. Patient denies any N/V/D or fever. Patient is controlling her pain with Ibuprofen. Made a 1 week p/o appt for 2024 to check to make sure she is emptying her bladder, patient aware and v/u. Informed patient how to contact the on call Dr. Patient v/u

## 2024-11-13 ENCOUNTER — HOSPITAL ENCOUNTER (OUTPATIENT)
Age: 44
Setting detail: SPECIMEN
Discharge: HOME | End: 2024-11-13

## 2024-11-13 ENCOUNTER — OFFICE VISIT (OUTPATIENT)
Age: 44
End: 2024-11-13

## 2024-11-13 VITALS — SYSTOLIC BLOOD PRESSURE: 109 MMHG | DIASTOLIC BLOOD PRESSURE: 78 MMHG | OXYGEN SATURATION: 100 % | HEART RATE: 78 BPM

## 2024-11-13 DIAGNOSIS — R39.15 URINARY URGENCY: ICD-10-CM

## 2024-11-13 DIAGNOSIS — R33.9 RETENTION OF URINE: Primary | ICD-10-CM

## 2024-11-13 DIAGNOSIS — Z09 POSTOPERATIVE EXAMINATION: ICD-10-CM

## 2024-11-13 DIAGNOSIS — R33.9 RETENTION OF URINE: ICD-10-CM

## 2024-11-13 LAB
BILIRUBIN, POC: NORMAL
BLOOD URINE, POC: 50
CLARITY, POC: NORMAL
COLOR, POC: NORMAL
GLUCOSE URINE, POC: NORMAL MG/DL
KETONES, POC: NORMAL MG/DL
LEUKOCYTE EST, POC: NORMAL
NITRITE, POC: NORMAL
PH, POC: 7
PROTEIN, POC: NORMAL MG/DL
SPECIFIC GRAVITY, POC: 1
UROBILINOGEN, POC: NORMAL MG/DL

## 2024-11-13 RX ORDER — FLUCONAZOLE 150 MG/1
150 TABLET ORAL ONCE
COMMUNITY
Start: 2024-11-06

## 2024-11-13 RX ORDER — TAMSULOSIN HYDROCHLORIDE 0.4 MG/1
0.4 CAPSULE ORAL DAILY
Qty: 7 CAPSULE | Refills: 1 | Status: SHIPPED | OUTPATIENT
Start: 2024-11-13

## 2024-11-13 NOTE — PROGRESS NOTES
One week post-op    Surgery Type: REVISION URETHRAL SLING INSERTION - SLING LYSIS   Surgery Date: 11/06/2024    Date urinary cathter was pulled\" n/a  Voiding w/o difficulty:no    Level of pain: 0  Pain medication;   Ibuprofen: 600mg  Tylenol:  n/a  Norco:not taking, date of last dose: n/a  Percocet: not taking, date of last dose: n/a    Stool softener: not taking  Last bowel movement: 11/13/2024  Bowel movement description: soft and formed    Antibiotics given: yes  Antibiotics completed: no    Did patient use:   Simethicone no  Flomax no    Vaginal bleeding: yes  Discharge: yes   
diet (unless otherwise instructed by the practitioner).  The more activity the greater the probability bowels will work properly.  Increase fluid intake, preferably water 64-96 ounces a day.  Use a vegetable non-stimulant stool softener when necessary and try to avoid long-term laxatives.    The patient may resume intercourse at the 2 week jenna for outpatient surgeries, 6 week jenna for larger reconstructive surgeries, and 8 weeks for robotic surgeries.  If postoperative vaginal swelling, pain, or bleeding persists patient may wish to delay intercourse 2-4 weeks.  Just as a hip replacement requires stretching and therapeutic use, so does the operated vagina.  The patient understands that there are nonsurgical options to help her should she have painful intercourse such as lubrication and dilator therapy.     It is okay to resume light exercise like walking right away.  For patients with prolapse repairs it is recommended not to exercise heavily or lift objects heavier than 15-20 pounds until her final exam.  This may seem unrealistic.  Try to put off lifting as long as possible for at this time the patient will continue to heal for the next 6-12 months.  If lifting is unavoidable, the patient is instructed not to hold their breath but blow out as they lift to decrease abdominal and pelvic pressure.  The patient is aware if they choose to lift objects heavier than recommended or engage in unhealthy pelvic activity they may increase the risk of prolapse recurrence. An unhealthy return to smoking or poorly controlled diabetes may also hasten surgical failure. The patient understands that it is up to her to have common sense in regard to her daily activities.  More strain on her surgical site may lead to suboptimal long-term healing.  The patient realizes it is impossible to mention every activity that could be deleterious to her long-term postoperative recovery.    The patient understands to call the office if there is

## 2024-11-14 LAB
MICROORGANISM SPEC CULT: NORMAL
SERVICE CMNT-IMP: NORMAL
SPECIMEN DESCRIPTION: NORMAL

## 2024-11-18 NOTE — PROGRESS NOTES
[facility] UROGYNECOLOGY AND PELVIC REHABILITATION   [address] [place] [address]  Dept: [phone]   Patient:  Cally Ballesteros    :  1980   Visit Date:  2024     VISIT - FINAL OUTPATIENT POST OP EXAM  CC: The patient presents for a final outpatient postoperative exam. had no chief complaint listed for this encounter.    Chaperone present for entire visit and pertinent physical exam: Resident    Will send today's note to PCP / referral provider.    HPI: SP sling lysis. Voided right away.  She is voiding well without retention or UTI symptoms.  No pain.  No bleeding.  She is totally continent.  She walked up the stairs today without leakage.    Status post: Revision Urethral Sling Insertion - Sling Lysis   Date: 2024   Pathology benign. Significant findings: None  Satisfaction: very satisfied    Vitals and signs or symptoms of UTI or infection were assessed. The presence of fevers, nausea, vomiting, chest pain, shortness of breath, or calf pain were evaluated.  Postoperative pain was evaluated.  Bowel and bladder habits were reviewed.  Any remaining vaginal discharge and/or bleeding was confirmed. Medications and compliance were reviewed.    ROS:  Review of Systems  All systems regative    PHYSICAL EXAM:  BP 97/69 (Site: Right Upper Arm, Position: Sitting, Cuff Size: Medium Adult)   Pulse 76   SpO2 100%     OBGyn Exam     A direct exam or the postoperative site/s:   Unremarkable with normal abdominopelvic findings, All incisions pertinent to the procedure are healing well and are dry and intact without signs of infection, herniation, or mass, No CVA tenderness, No lymphadenopathy, External vulvovaginal exam reveals no bleeding, abnormal swelling or symmetry, infection, or discharge, Internal vaginal/rectal speculum/digital exam shows excellent healing with no infection, agglutination, scar banding or

## 2024-11-19 ENCOUNTER — OFFICE VISIT (OUTPATIENT)
Age: 44
End: 2024-11-19

## 2024-11-19 VITALS — OXYGEN SATURATION: 100 % | DIASTOLIC BLOOD PRESSURE: 69 MMHG | SYSTOLIC BLOOD PRESSURE: 97 MMHG | HEART RATE: 76 BPM

## 2024-11-19 DIAGNOSIS — R33.9 RETENTION OF URINE: Primary | ICD-10-CM

## 2024-11-19 DIAGNOSIS — R33.9 URINARY RETENTION: ICD-10-CM

## 2024-11-20 PROBLEM — Z98.890 POST-OPERATIVE STATE: Status: RESOLVED | Noted: 2024-10-21 | Resolved: 2024-11-20

## 2024-11-22 ENCOUNTER — PATIENT MESSAGE (OUTPATIENT)
Age: 44
End: 2024-11-22

## 2024-11-22 ENCOUNTER — TELEPHONE (OUTPATIENT)
Age: 44
End: 2024-11-22

## 2024-11-22 DIAGNOSIS — R39.9 UTI SYMPTOMS: Primary | ICD-10-CM

## 2024-11-22 DIAGNOSIS — B37.31 VULVOVAGINITIS DUE TO YEAST: Primary | ICD-10-CM

## 2024-11-22 RX ORDER — NITROFURANTOIN 25; 75 MG/1; MG/1
100 CAPSULE ORAL 2 TIMES DAILY
Qty: 20 CAPSULE | Refills: 0 | Status: SHIPPED | OUTPATIENT
Start: 2024-11-22 | End: 2024-12-02

## 2024-11-22 NOTE — TELEPHONE ENCOUNTER
Patient called stating last night she started with low pelvic pressure, vaginal burning with urination and stabbing pain. Writer offered her an appt but she can not make an appt today. Informed patient that we like to send a urine out so they may have her go to urgent care. Patient states she can not go to urgent care either. Patient is requesting an ATB to get her over the weekend and she will make a follow up next week, please advise?

## 2024-11-25 NOTE — TELEPHONE ENCOUNTER
Patient was not able to  the ATB because it was sent to the pharmacy that closed at 4:00 but they did not call her until 3:45 pm and she could not make it. Patient p/u the ATB today and started it. Patient states things just don't seem right and she is questioning herself about having the surgery. Writer encouraged patient that this is all the healing process and once that is completed she will be much happier. Informed patient that she still has some swelling from surgery. Patient v/u. Patient was offered an appt but stated she wanted to wait and start the ATB first. Advised patient to call us early Wednesday if she is not feeling any better so we can see her before the holiday weekend. Patient v/u

## 2024-12-04 ENCOUNTER — TELEPHONE (OUTPATIENT)
Age: 44
End: 2024-12-04

## 2024-12-04 NOTE — TELEPHONE ENCOUNTER
Pt is requesting a diflucan after finishing ATB for UTI, pt did try an OTC medication and it didn't work. Please send to [facility] in [place]

## 2024-12-05 ENCOUNTER — TELEPHONE (OUTPATIENT)
Age: 44
End: 2024-12-05

## 2024-12-05 RX ORDER — FLUCONAZOLE 150 MG/1
150 TABLET ORAL
Qty: 2 TABLET | Refills: 0 | Status: SHIPPED | OUTPATIENT
Start: 2024-12-05 | End: 2024-12-11

## 2024-12-05 NOTE — TELEPHONE ENCOUNTER
Rx was sent to wrong pharmacy, Pt needed it to go to [facility] in [place], canceled script at [facility] and called in verbal to [facility]. Pt notified

## 2024-12-18 ENCOUNTER — OFFICE VISIT (OUTPATIENT)
Age: 44
End: 2024-12-18
Payer: MEDICAID

## 2024-12-18 ENCOUNTER — HOSPITAL ENCOUNTER (OUTPATIENT)
Age: 44
Setting detail: SPECIMEN
Discharge: HOME | End: 2024-12-18

## 2024-12-18 VITALS
HEART RATE: 84 BPM | SYSTOLIC BLOOD PRESSURE: 112 MMHG | DIASTOLIC BLOOD PRESSURE: 76 MMHG | TEMPERATURE: 98 F | OXYGEN SATURATION: 96 %

## 2024-12-18 DIAGNOSIS — R39.9 UTI SYMPTOMS: ICD-10-CM

## 2024-12-18 DIAGNOSIS — R30.0 DYSURIA: Primary | ICD-10-CM

## 2024-12-18 DIAGNOSIS — B37.31 VULVOVAGINITIS DUE TO YEAST: ICD-10-CM

## 2024-12-18 LAB
BILIRUBIN, POC: ABNORMAL
BILIRUBIN, POC: ABNORMAL
BLOOD URINE, POC: 50
BLOOD URINE, POC: ABNORMAL
CANDIDA SPECIES: NEGATIVE
CLARITY, POC: CLEAR
CLARITY, POC: CLEAR
COLOR, POC: ABNORMAL
COLOR, POC: YELLOW
GARDNERELLA VAGINALIS: POSITIVE
GLUCOSE URINE, POC: ABNORMAL MG/DL
GLUCOSE URINE, POC: ABNORMAL MG/DL
KETONES, POC: ABNORMAL MG/DL
KETONES, POC: ABNORMAL MG/DL
LEUKOCYTE EST, POC: 500
LEUKOCYTE EST, POC: ABNORMAL
NITRITE, POC: ABNORMAL
NITRITE, POC: ABNORMAL
PH, POC: 7
PH, POC: 8
PROTEIN, POC: ABNORMAL MG/DL
PROTEIN, POC: ABNORMAL MG/DL
SOURCE: ABNORMAL
SPECIFIC GRAVITY, POC: 1.01
SPECIFIC GRAVITY, POC: 1.01
TRICHOMONAS: NEGATIVE
UROBILINOGEN, POC: ABNORMAL MG/DL
UROBILINOGEN, POC: ABNORMAL MG/DL

## 2024-12-18 PROCEDURE — 81003 URINALYSIS AUTO W/O SCOPE: CPT | Performed by: NURSE PRACTITIONER

## 2024-12-18 PROCEDURE — 99214 OFFICE O/P EST MOD 30 MIN: CPT | Performed by: NURSE PRACTITIONER

## 2024-12-18 RX ORDER — FLUCONAZOLE 150 MG/1
150 TABLET ORAL
Qty: 2 TABLET | Refills: 0 | Status: SHIPPED | OUTPATIENT
Start: 2024-12-18 | End: 2024-12-24

## 2024-12-18 RX ORDER — NITROFURANTOIN 25; 75 MG/1; MG/1
100 CAPSULE ORAL 2 TIMES DAILY
Qty: 20 CAPSULE | Refills: 0 | Status: SHIPPED | OUTPATIENT
Start: 2024-12-18 | End: 2024-12-28

## 2024-12-18 NOTE — PATIENT INSTRUCTIONS
Double void measuring amount voided, and self cath twice daily, measuring amount retained  Please track amounts of urine voided and catheterized  Continue adequate water intake, voiding before/after intercourse

## 2024-12-18 NOTE — ADDENDUM NOTE
Addended by: ERIKA CLEMENTS on: 12/18/2024 11:26 AM     Modules accepted: Orders     Additional EKG Note...

## 2024-12-18 NOTE — PROGRESS NOTES
[facility], [facility] UROGYNECOLOGY AND PELVIC REHABILITATION   [address] [place] [address]  Dept: [phone]   Patient:  Cally Ballesteros   :  1980   Visit Date:  2024     VISIT - UTI     CC: UTI symptoms    Chaperone present for entire visit and pertinent physical exam: Staff    HPI: Pt had intercourse yesterday, this morning noting suprapubic pressure, urgency since surgery, not up at night to void.    Status post: Revision Urethral Sling Insertion - Sling Lysis   Date: 2024   Symptoms, onset of symptoms, progression of symptoms, severity of pain, cloudy urine, hematuria, frequency / urgency, flank pain, and current / prior treatments and cultures if available were reviewed.    Suprapubic pain: Yes  Flank pain: No  Cloudy urine: No  Odiferous urine: No  Hematuria: No  Fever: No  Confusion: No    ROS:  Review of Systems   All other systems reviewed and are negative.      PHYSICAL EXAM:  /76   Pulse 84   Temp 98 °F (36.7 °C)   SpO2 96%     Physical Exam  Constitutional:       Appearance: Normal appearance. She is normal weight.   Genitourinary:      Genitourinary Comments: After verbal consent, Straight cathed following strict sterile technique, 14 FR, 200cc clear yellow urine, pt tolerated well   HENT:      Head: Normocephalic and atraumatic.      Right Ear: Tympanic membrane normal.      Left Ear: Tympanic membrane normal.      Nose: Nose normal.      Mouth/Throat:      Mouth: Mucous membranes are moist.      Pharynx: Oropharynx is clear.   Eyes:      Extraocular Movements: Extraocular movements intact.      Conjunctiva/sclera: Conjunctivae normal.   Cardiovascular:      Rate and Rhythm: Normal rate and regular rhythm.   Pulmonary:      Effort: Pulmonary effort is normal.      Breath sounds: Normal breath sounds.   Abdominal:      General: Abdomen is flat. Bowel sounds are normal.      Tenderness: There is no

## 2024-12-19 DIAGNOSIS — N76.0 VULVOVAGINITIS: Primary | ICD-10-CM

## 2024-12-19 LAB
MICROORGANISM SPEC CULT: NO GROWTH
SPECIMEN DESCRIPTION: NORMAL

## 2024-12-19 RX ORDER — METRONIDAZOLE 7.5 MG/G
1 GEL VAGINAL DAILY
Qty: 70 G | Refills: 0 | Status: SHIPPED | OUTPATIENT
Start: 2024-12-19 | End: 2024-12-24

## 2024-12-19 NOTE — RESULT ENCOUNTER NOTE
Cally,  Your vaginal culture was positive for overgrowth of vaginal bacteria, negative for yeast. You do not need to take the second dose of Diflucan and I sent a vaginal antibiotic to your pharmacy ([facility]). If you are not taking a probiotic for vaginal/bladder health, I suggest starting as this can help return your vaginal shawn (good bacteria) to normal levels.

## 2025-01-03 ENCOUNTER — TELEPHONE (OUTPATIENT)
Age: 45
End: 2025-01-03

## 2025-01-03 NOTE — TELEPHONE ENCOUNTER
Going though labs and seen note, unsure if pt had been called because it looked as sonal note was signed and not forwarded to clinical  Pt doing well, completed medication, No longer self cath, emptying bladder w/o difficulty. Pt has started a probiotic and otc UTI prevention and seems to be working great.

## 2025-01-17 ENCOUNTER — OFFICE VISIT (OUTPATIENT)
Age: 45
End: 2025-01-17
Payer: MEDICAID

## 2025-01-17 VITALS
DIASTOLIC BLOOD PRESSURE: 79 MMHG | OXYGEN SATURATION: 97 % | HEART RATE: 88 BPM | TEMPERATURE: 97.3 F | WEIGHT: 200 LBS | BODY MASS INDEX: 32.14 KG/M2 | SYSTOLIC BLOOD PRESSURE: 110 MMHG | HEIGHT: 66 IN

## 2025-01-17 DIAGNOSIS — L30.9 DERMATITIS OF LIP: ICD-10-CM

## 2025-01-17 DIAGNOSIS — L70.0 ACNE VULGARIS: Primary | ICD-10-CM

## 2025-01-17 DIAGNOSIS — L81.4 LENTIGINES: ICD-10-CM

## 2025-01-17 DIAGNOSIS — L82.1 SEBORRHEIC KERATOSES: ICD-10-CM

## 2025-01-17 PROCEDURE — 99204 OFFICE O/P NEW MOD 45 MIN: CPT | Performed by: PHYSICIAN ASSISTANT

## 2025-01-17 PROCEDURE — 99203 OFFICE O/P NEW LOW 30 MIN: CPT | Performed by: PHYSICIAN ASSISTANT

## 2025-01-17 RX ORDER — TACROLIMUS 1 MG/G
OINTMENT TOPICAL
Qty: 60 G | Refills: 3 | Status: SHIPPED | OUTPATIENT
Start: 2025-01-17

## 2025-01-17 RX ORDER — SPIRONOLACTONE 50 MG/1
50 TABLET, FILM COATED ORAL DAILY
Qty: 90 TABLET | Refills: 3 | Status: SHIPPED | OUTPATIENT
Start: 2025-01-17

## 2025-01-17 NOTE — PROGRESS NOTES
pregnancy needs to be avoided while on this medication. Discussed risk of elevated potassium and the need to stay away from potassium supplements while on the medication.   - spironolactone (ALDACTONE) 50 MG tablet; Take 1 tablet by mouth daily  Dispense: 90 tablet; Refill: 3    2. Dermatitis of lip (irritant derm vs LP, concurrent geographic tongue)  - chronic illness with progression and/or exacerbation   - tacrolimus (PROTOPIC) 0.1 % ointment; Apply to affected areas of lips twice daily, as needed.  Dispense: 60 g; Refill: 3    3. Seborrheic keratoses (Left inframammary fold)  - reassurance and education     4. Lentigines  - reassurance and education       RTC 3M    Future Appointments   Date Time Provider Department Center   2/6/2025  4:00 PM Sera Orellana DO [id number]   4/29/2025  9:45 AM [facility]         There are no Patient Instructions on file for this visit.      Electronically signed by Eliazar Galeas PA-C on 1/17/25 at 10:28 AM EST

## 2025-02-05 DIAGNOSIS — G44.209 ACUTE NON INTRACTABLE TENSION-TYPE HEADACHE: ICD-10-CM

## 2025-02-06 ENCOUNTER — TELEMEDICINE (OUTPATIENT)
Dept: OBGYN CLINIC | Age: 45
End: 2025-02-06
Payer: MEDICAID

## 2025-02-06 DIAGNOSIS — N95.1 PERIMENOPAUSE: Primary | ICD-10-CM

## 2025-02-06 DIAGNOSIS — G43.809 OTHER MIGRAINE WITHOUT STATUS MIGRAINOSUS, NOT INTRACTABLE: ICD-10-CM

## 2025-02-06 PROCEDURE — 99214 OFFICE O/P EST MOD 30 MIN: CPT | Performed by: STUDENT IN AN ORGANIZED HEALTH CARE EDUCATION/TRAINING PROGRAM

## 2025-02-06 RX ORDER — METOCLOPRAMIDE 5 MG/1
10 TABLET ORAL 3 TIMES DAILY PRN
Qty: 30 TABLET | Refills: 0 | Status: SHIPPED | OUTPATIENT
Start: 2025-02-06 | End: 2025-02-21

## 2025-02-06 RX ORDER — NORETHINDRONE ACETATE AND ETHINYL ESTRADIOL 1MG-20(21)
1 KIT ORAL DAILY
Qty: 3 PACKET | Refills: 4 | Status: SHIPPED | OUTPATIENT
Start: 2025-02-06

## 2025-02-06 NOTE — PROGRESS NOTES
TELEHEALTH EVALUATION -- Audio/Visual    HPI:    This visit was completed via MyChart video. All issues as below were discussed and addressed but no physical exam was performed unless allowed by visual confirmation on MyChart video. Reviewed that if it was felt that the patient should be evaluated in clinic then she would be directed there. The patient verbally consented to visit.  She has requested an audio/video evaluation for the following concern(s): mood changes.  She is at home alone.      Reports no vaginal bleeding in 11 years since her ablation. States mood disturbances. Does report history anxiety and depression and has been managed on medications. She would like to wean off those medications but also feels mood changes are increasing and wondering about hormones. States did use COCP in the past in her 20s which helped a lot with moods. She would like to trial cocp. Does not report cyclic changes.    She was seen and counseled on all forms of birth control both male and female, reversible and none for control of heavy or painful periods or contraceptive purposes.  Discussed possible increased risk of developing a blood clot which may increase morbidity and mortality.  Discussed that smoking while on contraception may increase this risk and all patients are encouraged to stop use of tobacco products.  She was instructed on barrier contraception for STD prevention.  The life-threatening side effect profile was reviewed in detail and the patient was instructed that if these occur to stop using the medication and report to the emergency department or call 911. She denies a personal or family history of blood clots in the lungs or legs, stroke, TIA, migraines with aura, or sudden cardiac death.  Contraindications to estrogen: no    She does currently have a migraine and reports has one once per year. No aura. Will send reglan and she will take that with benadryl to try to break the migraine. She does have

## 2025-02-07 RX ORDER — TIZANIDINE 2 MG/1
2 TABLET ORAL NIGHTLY PRN
Qty: 10 TABLET | Refills: 3 | Status: SHIPPED | OUTPATIENT
Start: 2025-02-07

## 2025-04-21 DIAGNOSIS — N95.1 PERIMENOPAUSE: ICD-10-CM

## 2025-04-21 RX ORDER — NORETHINDRONE ACETATE AND ETHINYL ESTRADIOL AND FERROUS FUMARATE 1MG-20(21)
1 KIT ORAL DAILY
Qty: 3 PACKET | Refills: 1 | Status: SHIPPED | OUTPATIENT
Start: 2025-04-21

## 2025-07-07 ENCOUNTER — HOSPITAL ENCOUNTER (OUTPATIENT)
Age: 45
Setting detail: SPECIMEN
Discharge: HOME | End: 2025-07-07

## 2025-07-07 ENCOUNTER — TELEPHONE (OUTPATIENT)
Dept: OBGYN CLINIC | Age: 45
End: 2025-07-07

## 2025-07-07 ENCOUNTER — OFFICE VISIT (OUTPATIENT)
Dept: FAMILY MEDICINE CLINIC | Age: 45
End: 2025-07-07
Payer: MEDICAID

## 2025-07-07 VITALS
SYSTOLIC BLOOD PRESSURE: 124 MMHG | TEMPERATURE: 97.3 F | WEIGHT: 197 LBS | RESPIRATION RATE: 16 BRPM | HEART RATE: 79 BPM | BODY MASS INDEX: 31.8 KG/M2 | DIASTOLIC BLOOD PRESSURE: 78 MMHG | OXYGEN SATURATION: 97 %

## 2025-07-07 DIAGNOSIS — N89.8 VAGINAL DISCHARGE: ICD-10-CM

## 2025-07-07 DIAGNOSIS — N89.8 VAGINAL ITCHING: ICD-10-CM

## 2025-07-07 DIAGNOSIS — R30.0 DYSURIA: Primary | ICD-10-CM

## 2025-07-07 DIAGNOSIS — R30.0 DYSURIA: ICD-10-CM

## 2025-07-07 LAB
BILIRUBIN, POC: NEGATIVE
BLOOD URINE, POC: NORMAL
CLARITY, POC: CLEAR
COLOR, POC: YELLOW
GLUCOSE URINE, POC: NEGATIVE MG/DL
KETONES, POC: NEGATIVE MG/DL
LEUKOCYTE EST, POC: NEGATIVE
NITRITE, POC: NEGATIVE
PH, POC: 5.5
PROTEIN, POC: NEGATIVE MG/DL
SPECIFIC GRAVITY, POC: 1.02
UROBILINOGEN, POC: 0.2 MG/DL

## 2025-07-07 PROCEDURE — 81003 URINALYSIS AUTO W/O SCOPE: CPT | Performed by: NURSE PRACTITIONER

## 2025-07-07 PROCEDURE — 99203 OFFICE O/P NEW LOW 30 MIN: CPT | Performed by: NURSE PRACTITIONER

## 2025-07-07 ASSESSMENT — PATIENT HEALTH QUESTIONNAIRE - PHQ9
2. FEELING DOWN, DEPRESSED OR HOPELESS: NOT AT ALL
SUM OF ALL RESPONSES TO PHQ QUESTIONS 1-9: 0
1. LITTLE INTEREST OR PLEASURE IN DOING THINGS: NOT AT ALL
SUM OF ALL RESPONSES TO PHQ QUESTIONS 1-9: 0

## 2025-07-07 NOTE — PROGRESS NOTES
[facility] PHYSICIANS [facility], [facility] WALK-IN  [address]  [address]  [facility] [address]  Dept: [phone]     Cally Ballesteros is a 44 y.o. female New patient, who presents to the walk-in clinic today with conditions/complaints as noted below:    Chief Complaint   Patient presents with    Vaginal Itching     Vaginal dryness, itching, odor, and discomfort. Possibly related to atb.     Urinary Frequency     Recently on atb for UTI.          HPI:     HPI  Pt presented to the walk in clinic today with c/o UTI. This is a new problem. The current episode started 3 weeks ago. Was seen by UC mid June. Was given keflex, diflucan x 2 and pyridium and symptoms did not improve. Associated symptoms include: dysuria, frequency, vaginal dryness, itching, discharge, odor .  Pertinent negatives include: No fever, chills, n/v, back pain, abdominal pain. Is sexually active. No concern for STI but is agreeable to testing due to ongoing symptoms. Does have hx of bladder sling last fall. Reports she has had issues with this since. Sees urogyn for this.        Past Medical History:   Diagnosis Date    Anxiety     Anxiety and depression     Dyspareunia, female     Migraine 10years    Urine incontinence     Vaginal discharge     Vulvar irritation        Current Outpatient Medications   Medication Sig Dispense Refill    tiZANidine (ZANAFLEX) 2 MG tablet Take 1 tablet by mouth nightly as needed (nightly for neck pain stifness) 10 tablet 3    spironolactone (ALDACTONE) 50 MG tablet Take 1 tablet by mouth daily 90 tablet 3    tacrolimus (PROTOPIC) 0.1 % ointment Apply to affected areas of lips twice daily, as needed. 60 g 3    VIIBRYD 20 MG Tablet Take 1 tablet by mouth daily      traZODone (DESYREL) 50 MG tablet Take 1 tablet by mouth nightly      clonazePAM (KLONOPIN) 0.5 MG tablet Take 1 tablet by mouth 2 times daily as needed.      pregabalin (LYRICA) 100 MG capsule Take 1 capsule by

## 2025-07-07 NOTE — TELEPHONE ENCOUNTER
Patient wrote my chart:          LVM for patient, stating that can go to urgent care, or pcp, or call our office back to schedule an appointment

## 2025-07-08 ENCOUNTER — RESULTS FOLLOW-UP (OUTPATIENT)
Dept: FAMILY MEDICINE CLINIC | Age: 45
End: 2025-07-08

## 2025-07-08 DIAGNOSIS — B96.89 BV (BACTERIAL VAGINOSIS): Primary | ICD-10-CM

## 2025-07-08 DIAGNOSIS — N76.0 BV (BACTERIAL VAGINOSIS): Primary | ICD-10-CM

## 2025-07-08 LAB
CANDIDA SPECIES: NEGATIVE
CHLAMYDIA DNA UR QL NAA+PROBE: NEGATIVE
GARDNERELLA VAGINALIS: POSITIVE
MICROORGANISM SPEC CULT: NORMAL
N GONORRHOEA DNA UR QL NAA+PROBE: NEGATIVE
SERVICE CMNT-IMP: NORMAL
SOURCE: ABNORMAL
SPECIMEN DESCRIPTION: NORMAL
SPECIMEN DESCRIPTION: NORMAL
TRICHOMONAS: NEGATIVE

## 2025-07-08 RX ORDER — METRONIDAZOLE 500 MG/1
500 TABLET ORAL 2 TIMES DAILY
Qty: 14 TABLET | Refills: 0 | Status: SHIPPED | OUTPATIENT
Start: 2025-07-08 | End: 2025-07-08

## 2025-07-08 RX ORDER — METRONIDAZOLE 500 MG/1
500 TABLET ORAL 2 TIMES DAILY
Qty: 14 TABLET | Refills: 0 | Status: SHIPPED | OUTPATIENT
Start: 2025-07-08 | End: 2025-07-15

## 2025-07-08 RX ORDER — METRONIDAZOLE 500 MG/1
500 TABLET ORAL 2 TIMES DAILY
Qty: 14 TABLET | Refills: 0 | Status: CANCELLED | OUTPATIENT
Start: 2025-07-08 | End: 2025-07-15

## 2025-07-08 RX ORDER — FLUCONAZOLE 150 MG/1
150 TABLET ORAL
Qty: 2 TABLET | Refills: 0 | Status: SHIPPED | OUTPATIENT
Start: 2025-07-08 | End: 2025-07-14

## (undated) DEVICE — SOLUTION IRRIG 500ML 0.9% SOD CHLO USP POUR PLAS BTL

## (undated) DEVICE — GLOVE ORANGE PI 7 1/2   MSG9075

## (undated) DEVICE — MHPB GYN MINOR PACK: Brand: MEDLINE INDUSTRIES, INC.

## (undated) DEVICE — CONTAINER,SPECIMEN,4OZ,OR STRL: Brand: MEDLINE

## (undated) DEVICE — BLADE CLIPPER SURG SENSICLIP

## (undated) DEVICE — SUTURE VICRYL + SZ 1 L36IN ABSRB UD L36MM CT-1 1/2 CIR VCP947H

## (undated) DEVICE — SYRINGE IRRIG 60ML SFT PLIABLE BLB EZ TO GRP 1 HND USE W/

## (undated) DEVICE — NEEDLE,22GX1.5",REG,BEVEL: Brand: MEDLINE

## (undated) DEVICE — BLANKET WRM W29.9XL79.1IN UP BODY FORC AIR MISTRAL-AIR

## (undated) DEVICE — NEEDLE HYPO 25GA L1.5IN BLU POLYPR HUB S STL REG BVL STR

## (undated) DEVICE — SOLUTION IV 1000ML 0.9% SOD CHL PH 5 INJ USP VIAFLX PLAS

## (undated) DEVICE — SYRINGE, LUER LOCK, 10ML: Brand: MEDLINE

## (undated) DEVICE — INTENDED FOR TISSUE SEPARATION, AND OTHER PROCEDURES THAT REQUIRE A SHARP SURGICAL BLADE TO PUNCTURE OR CUT.: Brand: BARD-PARKER ® CARBON RIB-BACK BLADES

## (undated) DEVICE — STRAP RESTRAIN W3.5XL19IN TECLIN STRRP POS LEG DURING LITH

## (undated) DEVICE — YANKAUER,FLEXIBLE HANDLE,REGLR CAPACITY: Brand: MEDLINE INDUSTRIES, INC.

## (undated) DEVICE — STRAP,POSITIONING,KNEE/BODY,FOAM,4X60": Brand: MEDLINE

## (undated) DEVICE — NEPTUNE E-SEP SMOKE EVACUATION PENCIL, COATED, 70MM BLADE, PUSH BUTTON SWITCH: Brand: NEPTUNE E-SEP

## (undated) DEVICE — NEEDLE SPNL L3.5IN PNK HUB S STL REG WALL FIT STYL W/ QNCKE

## (undated) DEVICE — 1010 S-DRAPE TOWEL DRAPE 10/BX: Brand: STERI-DRAPE™

## (undated) DEVICE — MARKER,SKIN,WI/RULER AND LABELS: Brand: MEDLINE

## (undated) DEVICE — ELECTRODE PT RET AD L9FT HI MOIST COND ADH HYDRGEL CORDED

## (undated) DEVICE — LIQUIBAND RAPID ADHESIVE 36/CS 0.8ML: Brand: MEDLINE

## (undated) DEVICE — Y-TYPE TUR/BLADDER IRRIGATION SET, REGULATING CLAMP

## (undated) DEVICE — SUTURE VICRYL + SZ 2-0 L27IN ABSRB UD CT-2 L26MM 1/2 CIR TAPR VCP269H

## (undated) DEVICE — SUTURE VICRYL SZ 2-0 L27IN ABSRB UD L26MM CT-2 1/2 CIR J269H

## (undated) DEVICE — COUNTER NDL 40 COUNT HLD 70 FOAM BLK ADH W/ MAG

## (undated) DEVICE — SYRINGE MED 30ML STD CLR PLAS LUERLOCK TIP N CTRL DISP